# Patient Record
Sex: FEMALE | Race: WHITE | NOT HISPANIC OR LATINO | URBAN - METROPOLITAN AREA
[De-identification: names, ages, dates, MRNs, and addresses within clinical notes are randomized per-mention and may not be internally consistent; named-entity substitution may affect disease eponyms.]

---

## 2022-03-29 ENCOUNTER — INPATIENT (INPATIENT)
Facility: HOSPITAL | Age: 87
LOS: 1 days | Discharge: HOME HEALTH | DRG: 483 | End: 2022-03-31
Attending: ORTHOPAEDIC SURGERY | Admitting: ORTHOPAEDIC SURGERY
Payer: MEDICARE

## 2022-03-29 VITALS — HEIGHT: 61 IN | WEIGHT: 93.04 LBS

## 2022-03-29 DIAGNOSIS — S42.201A UNSPECIFIED FRACTURE OF UPPER END OF RIGHT HUMERUS, INITIAL ENCOUNTER FOR CLOSED FRACTURE: ICD-10-CM

## 2022-03-29 DIAGNOSIS — S42.291A OTHER DISPLACED FRACTURE OF UPPER END OF RIGHT HUMERUS, INITIAL ENCOUNTER FOR CLOSED FRACTURE: ICD-10-CM

## 2022-03-29 LAB
ADD ON TEST-SPECIMEN IN LAB: SIGNIFICANT CHANGE UP
ALBUMIN SERPL ELPH-MCNC: 3.5 G/DL — SIGNIFICANT CHANGE UP (ref 3.3–5)
ALP SERPL-CCNC: 61 U/L — SIGNIFICANT CHANGE UP (ref 40–120)
ALT FLD-CCNC: 22 U/L — SIGNIFICANT CHANGE UP (ref 12–78)
ANION GAP SERPL CALC-SCNC: 6 MMOL/L — SIGNIFICANT CHANGE UP (ref 5–17)
APTT BLD: 26.5 SEC — LOW (ref 27.5–35.5)
AST SERPL-CCNC: 20 U/L — SIGNIFICANT CHANGE UP (ref 15–37)
BASOPHILS # BLD AUTO: 0.07 K/UL — SIGNIFICANT CHANGE UP (ref 0–0.2)
BASOPHILS NFR BLD AUTO: 0.7 % — SIGNIFICANT CHANGE UP (ref 0–2)
BILIRUB SERPL-MCNC: 0.9 MG/DL — SIGNIFICANT CHANGE UP (ref 0.2–1.2)
BUN SERPL-MCNC: 19 MG/DL — SIGNIFICANT CHANGE UP (ref 7–23)
CALCIUM SERPL-MCNC: 8.8 MG/DL — SIGNIFICANT CHANGE UP (ref 8.5–10.1)
CHLORIDE SERPL-SCNC: 107 MMOL/L — SIGNIFICANT CHANGE UP (ref 96–108)
CO2 SERPL-SCNC: 29 MMOL/L — SIGNIFICANT CHANGE UP (ref 22–31)
CREAT SERPL-MCNC: 0.53 MG/DL — SIGNIFICANT CHANGE UP (ref 0.5–1.3)
EGFR: 91 ML/MIN/1.73M2 — SIGNIFICANT CHANGE UP
EOSINOPHIL # BLD AUTO: 0.18 K/UL — SIGNIFICANT CHANGE UP (ref 0–0.5)
EOSINOPHIL NFR BLD AUTO: 1.8 % — SIGNIFICANT CHANGE UP (ref 0–6)
GLUCOSE SERPL-MCNC: 102 MG/DL — HIGH (ref 70–99)
HCT VFR BLD CALC: 29.6 % — LOW (ref 34.5–45)
HGB BLD-MCNC: 9.6 G/DL — LOW (ref 11.5–15.5)
IMM GRANULOCYTES NFR BLD AUTO: 0.2 % — SIGNIFICANT CHANGE UP (ref 0–1.5)
INR BLD: 1.05 RATIO — SIGNIFICANT CHANGE UP (ref 0.88–1.16)
LYMPHOCYTES # BLD AUTO: 2.09 K/UL — SIGNIFICANT CHANGE UP (ref 1–3.3)
LYMPHOCYTES # BLD AUTO: 21.5 % — SIGNIFICANT CHANGE UP (ref 13–44)
MCHC RBC-ENTMCNC: 31.9 PG — SIGNIFICANT CHANGE UP (ref 27–34)
MCHC RBC-ENTMCNC: 32.4 GM/DL — SIGNIFICANT CHANGE UP (ref 32–36)
MCV RBC AUTO: 98.3 FL — SIGNIFICANT CHANGE UP (ref 80–100)
MONOCYTES # BLD AUTO: 0.67 K/UL — SIGNIFICANT CHANGE UP (ref 0–0.9)
MONOCYTES NFR BLD AUTO: 6.9 % — SIGNIFICANT CHANGE UP (ref 2–14)
NEUTROPHILS # BLD AUTO: 6.7 K/UL — SIGNIFICANT CHANGE UP (ref 1.8–7.4)
NEUTROPHILS NFR BLD AUTO: 68.9 % — SIGNIFICANT CHANGE UP (ref 43–77)
PLATELET # BLD AUTO: 200 K/UL — SIGNIFICANT CHANGE UP (ref 150–400)
POTASSIUM SERPL-MCNC: 3.5 MMOL/L — SIGNIFICANT CHANGE UP (ref 3.5–5.3)
POTASSIUM SERPL-SCNC: 3.5 MMOL/L — SIGNIFICANT CHANGE UP (ref 3.5–5.3)
PROT SERPL-MCNC: 7 GM/DL — SIGNIFICANT CHANGE UP (ref 6–8.3)
PROTHROM AB SERPL-ACNC: 12.2 SEC — SIGNIFICANT CHANGE UP (ref 10.5–13.4)
RBC # BLD: 3.01 M/UL — LOW (ref 3.8–5.2)
RBC # FLD: 13.7 % — SIGNIFICANT CHANGE UP (ref 10.3–14.5)
SARS-COV-2 RNA SPEC QL NAA+PROBE: SIGNIFICANT CHANGE UP
SODIUM SERPL-SCNC: 142 MMOL/L — SIGNIFICANT CHANGE UP (ref 135–145)
WBC # BLD: 9.73 K/UL — SIGNIFICANT CHANGE UP (ref 3.8–10.5)
WBC # FLD AUTO: 9.73 K/UL — SIGNIFICANT CHANGE UP (ref 3.8–10.5)

## 2022-03-29 PROCEDURE — 99285 EMERGENCY DEPT VISIT HI MDM: CPT

## 2022-03-29 PROCEDURE — 73030 X-RAY EXAM OF SHOULDER: CPT | Mod: 26,RT

## 2022-03-29 PROCEDURE — 82962 GLUCOSE BLOOD TEST: CPT

## 2022-03-29 PROCEDURE — 93005 ELECTROCARDIOGRAM TRACING: CPT

## 2022-03-29 PROCEDURE — G1004: CPT

## 2022-03-29 PROCEDURE — 86900 BLOOD TYPING SEROLOGIC ABO: CPT

## 2022-03-29 PROCEDURE — 93010 ELECTROCARDIOGRAM REPORT: CPT

## 2022-03-29 PROCEDURE — 71250 CT THORAX DX C-: CPT

## 2022-03-29 PROCEDURE — 82728 ASSAY OF FERRITIN: CPT

## 2022-03-29 PROCEDURE — 82248 BILIRUBIN DIRECT: CPT

## 2022-03-29 PROCEDURE — 86920 COMPATIBILITY TEST SPIN: CPT

## 2022-03-29 PROCEDURE — 76376 3D RENDER W/INTRP POSTPROCES: CPT | Mod: 26

## 2022-03-29 PROCEDURE — 82746 ASSAY OF FOLIC ACID SERUM: CPT

## 2022-03-29 PROCEDURE — 99221 1ST HOSP IP/OBS SF/LOW 40: CPT

## 2022-03-29 PROCEDURE — 87635 SARS-COV-2 COVID-19 AMP PRB: CPT

## 2022-03-29 PROCEDURE — 73200 CT UPPER EXTREMITY W/O DYE: CPT | Mod: 26,RT,ME

## 2022-03-29 PROCEDURE — 71045 X-RAY EXAM CHEST 1 VIEW: CPT

## 2022-03-29 PROCEDURE — 97162 PT EVAL MOD COMPLEX 30 MIN: CPT | Mod: GP

## 2022-03-29 PROCEDURE — 73030 X-RAY EXAM OF SHOULDER: CPT | Mod: RT

## 2022-03-29 PROCEDURE — 36415 COLL VENOUS BLD VENIPUNCTURE: CPT

## 2022-03-29 PROCEDURE — 97116 GAIT TRAINING THERAPY: CPT | Mod: GP

## 2022-03-29 PROCEDURE — 83010 ASSAY OF HAPTOGLOBIN QUANT: CPT

## 2022-03-29 PROCEDURE — 86850 RBC ANTIBODY SCREEN: CPT

## 2022-03-29 PROCEDURE — 97530 THERAPEUTIC ACTIVITIES: CPT | Mod: GP

## 2022-03-29 PROCEDURE — 83615 LACTATE (LD) (LDH) ENZYME: CPT

## 2022-03-29 PROCEDURE — 82306 VITAMIN D 25 HYDROXY: CPT

## 2022-03-29 PROCEDURE — 83550 IRON BINDING TEST: CPT

## 2022-03-29 PROCEDURE — 86901 BLOOD TYPING SEROLOGIC RH(D): CPT

## 2022-03-29 PROCEDURE — 85610 PROTHROMBIN TIME: CPT

## 2022-03-29 PROCEDURE — 71250 CT THORAX DX C-: CPT | Mod: 26

## 2022-03-29 PROCEDURE — 85025 COMPLETE CBC W/AUTO DIFF WBC: CPT

## 2022-03-29 PROCEDURE — 83540 ASSAY OF IRON: CPT

## 2022-03-29 PROCEDURE — 82040 ASSAY OF SERUM ALBUMIN: CPT

## 2022-03-29 PROCEDURE — 85730 THROMBOPLASTIN TIME PARTIAL: CPT

## 2022-03-29 PROCEDURE — P9016: CPT

## 2022-03-29 PROCEDURE — C1713: CPT

## 2022-03-29 PROCEDURE — 80048 BASIC METABOLIC PNL TOTAL CA: CPT

## 2022-03-29 PROCEDURE — 71045 X-RAY EXAM CHEST 1 VIEW: CPT | Mod: 26

## 2022-03-29 PROCEDURE — 88311 DECALCIFY TISSUE: CPT

## 2022-03-29 PROCEDURE — 73060 X-RAY EXAM OF HUMERUS: CPT | Mod: 26,RT

## 2022-03-29 PROCEDURE — 85027 COMPLETE CBC AUTOMATED: CPT

## 2022-03-29 PROCEDURE — 82607 VITAMIN B-12: CPT

## 2022-03-29 PROCEDURE — 36430 TRANSFUSION BLD/BLD COMPNT: CPT

## 2022-03-29 PROCEDURE — 85045 AUTOMATED RETICULOCYTE COUNT: CPT

## 2022-03-29 PROCEDURE — 80053 COMPREHEN METABOLIC PANEL: CPT

## 2022-03-29 PROCEDURE — C1776: CPT

## 2022-03-29 PROCEDURE — 99222 1ST HOSP IP/OBS MODERATE 55: CPT

## 2022-03-29 PROCEDURE — 88305 TISSUE EXAM BY PATHOLOGIST: CPT

## 2022-03-29 RX ORDER — ATORVASTATIN CALCIUM 80 MG/1
1 TABLET, FILM COATED ORAL
Qty: 0 | Refills: 0 | DISCHARGE

## 2022-03-29 RX ORDER — OXYCODONE HYDROCHLORIDE 5 MG/1
10 TABLET ORAL EVERY 4 HOURS
Refills: 0 | Status: DISCONTINUED | OUTPATIENT
Start: 2022-03-29 | End: 2022-03-29

## 2022-03-29 RX ORDER — PROCHLORPERAZINE MALEATE 5 MG
10 TABLET ORAL EVERY 8 HOURS
Refills: 0 | Status: DISCONTINUED | OUTPATIENT
Start: 2022-03-29 | End: 2022-03-31

## 2022-03-29 RX ORDER — ATORVASTATIN CALCIUM 80 MG/1
10 TABLET, FILM COATED ORAL AT BEDTIME
Refills: 0 | Status: DISCONTINUED | OUTPATIENT
Start: 2022-03-29 | End: 2022-03-31

## 2022-03-29 RX ORDER — ESCITALOPRAM OXALATE 10 MG/1
10 TABLET, FILM COATED ORAL DAILY
Refills: 0 | Status: DISCONTINUED | OUTPATIENT
Start: 2022-03-29 | End: 2022-03-31

## 2022-03-29 RX ORDER — LANOLIN ALCOHOL/MO/W.PET/CERES
3 CREAM (GRAM) TOPICAL AT BEDTIME
Refills: 0 | Status: DISCONTINUED | OUTPATIENT
Start: 2022-03-29 | End: 2022-03-31

## 2022-03-29 RX ORDER — HEPARIN SODIUM 5000 [USP'U]/ML
5000 INJECTION INTRAVENOUS; SUBCUTANEOUS ONCE
Refills: 0 | Status: DISCONTINUED | OUTPATIENT
Start: 2022-03-29 | End: 2022-03-29

## 2022-03-29 RX ORDER — TRAMADOL HYDROCHLORIDE 50 MG/1
50 TABLET ORAL EVERY 6 HOURS
Refills: 0 | Status: DISCONTINUED | OUTPATIENT
Start: 2022-03-29 | End: 2022-03-31

## 2022-03-29 RX ORDER — HEPARIN SODIUM 5000 [USP'U]/ML
5000 INJECTION INTRAVENOUS; SUBCUTANEOUS ONCE
Refills: 0 | Status: COMPLETED | OUTPATIENT
Start: 2022-03-29 | End: 2022-03-29

## 2022-03-29 RX ORDER — SODIUM CHLORIDE 9 MG/ML
1000 INJECTION, SOLUTION INTRAVENOUS
Refills: 0 | Status: DISCONTINUED | OUTPATIENT
Start: 2022-03-29 | End: 2022-03-29

## 2022-03-29 RX ORDER — SODIUM CHLORIDE 9 MG/ML
1000 INJECTION, SOLUTION INTRAVENOUS
Refills: 0 | Status: DISCONTINUED | OUTPATIENT
Start: 2022-03-29 | End: 2022-03-31

## 2022-03-29 RX ORDER — ESCITALOPRAM OXALATE 10 MG/1
1 TABLET, FILM COATED ORAL
Qty: 0 | Refills: 0 | DISCHARGE

## 2022-03-29 RX ORDER — HYDROMORPHONE HYDROCHLORIDE 2 MG/ML
0.5 INJECTION INTRAMUSCULAR; INTRAVENOUS; SUBCUTANEOUS EVERY 4 HOURS
Refills: 0 | Status: DISCONTINUED | OUTPATIENT
Start: 2022-03-29 | End: 2022-03-31

## 2022-03-29 RX ORDER — OXYCODONE HYDROCHLORIDE 5 MG/1
5 TABLET ORAL EVERY 4 HOURS
Refills: 0 | Status: DISCONTINUED | OUTPATIENT
Start: 2022-03-29 | End: 2022-03-29

## 2022-03-29 RX ORDER — AMLODIPINE BESYLATE 2.5 MG/1
1 TABLET ORAL
Qty: 0 | Refills: 0 | DISCHARGE

## 2022-03-29 RX ORDER — TRAMADOL HYDROCHLORIDE 50 MG/1
25 TABLET ORAL EVERY 6 HOURS
Refills: 0 | Status: DISCONTINUED | OUTPATIENT
Start: 2022-03-29 | End: 2022-03-31

## 2022-03-29 RX ORDER — PANTOPRAZOLE SODIUM 20 MG/1
40 TABLET, DELAYED RELEASE ORAL
Refills: 0 | Status: DISCONTINUED | OUTPATIENT
Start: 2022-03-29 | End: 2022-03-31

## 2022-03-29 RX ORDER — ACETAMINOPHEN 500 MG
975 TABLET ORAL EVERY 8 HOURS
Refills: 0 | Status: DISCONTINUED | OUTPATIENT
Start: 2022-03-29 | End: 2022-03-31

## 2022-03-29 RX ORDER — SENNA PLUS 8.6 MG/1
2 TABLET ORAL AT BEDTIME
Refills: 0 | Status: DISCONTINUED | OUTPATIENT
Start: 2022-03-29 | End: 2022-03-31

## 2022-03-29 RX ORDER — LISINOPRIL 2.5 MG/1
1 TABLET ORAL
Qty: 0 | Refills: 0 | DISCHARGE

## 2022-03-29 RX ORDER — ONDANSETRON 8 MG/1
4 TABLET, FILM COATED ORAL EVERY 8 HOURS
Refills: 0 | Status: DISCONTINUED | OUTPATIENT
Start: 2022-03-29 | End: 2022-03-31

## 2022-03-29 RX ORDER — LISINOPRIL 2.5 MG/1
20 TABLET ORAL
Refills: 0 | Status: DISCONTINUED | OUTPATIENT
Start: 2022-03-29 | End: 2022-03-31

## 2022-03-29 RX ADMIN — Medication 0.1 MILLIGRAM(S): at 21:03

## 2022-03-29 RX ADMIN — Medication 975 MILLIGRAM(S): at 17:33

## 2022-03-29 RX ADMIN — ATORVASTATIN CALCIUM 10 MILLIGRAM(S): 80 TABLET, FILM COATED ORAL at 21:02

## 2022-03-29 RX ADMIN — LISINOPRIL 20 MILLIGRAM(S): 2.5 TABLET ORAL at 21:03

## 2022-03-29 NOTE — H&P ADULT - PROBLEM SELECTOR PLAN 1
Over 60 minutes spent reviewing the history, speaking with pt and their family member, discussing the nature of the injury, reviewing imaging, discussing the surgery, obtaining surgical consent and coordinating plan w Attending

## 2022-03-29 NOTE — PATIENT PROFILE ADULT - FALL HARM RISK - HARM RISK INTERVENTIONS

## 2022-03-29 NOTE — ED STATDOCS - NS ED ATTENDING STATEMENT MOD
This was a shared visit with the KELLEY. I reviewed and verified the documentation and independently performed the documented:

## 2022-03-29 NOTE — ED ADULT TRIAGE NOTE - CHIEF COMPLAINT QUOTE
Pt presents to Ed for evaluation of right arm that began friday. Pt fell off step  on friday due to visual disturbance from macular degeneration. Hit right arm. + head strike.  Melody FLORES. Was seen by medical center in [place] and was found to have a break. Took tylenol 1000mg today for pain. Pt presents to Ed for evaluation of right arm pain that began friday s/p fall due to visual disturbance from macular degeneration. + head strike.  Melody AC. Was seen by medical center in [place] and was found to have a break. Took tylenol 1000mg today for pain.

## 2022-03-29 NOTE — ED STATDOCS - ATTENDING CONTRIBUTION TO CARE
I, Michael Roque MD,  performed the initial face to face bedside interview with this patient regarding history of present illness, review of symptoms and relevant past medical, social and family history.  I completed an independent physical examination.  I was the initial provider who evaluated this patient.   I personally saw the patient and performed a substantive portion of the visit including all aspects of the medical decision making.  I have signed out the follow up of any pending tests (i.e. labs, radiological studies) to the KELLEY.  I have communicated the patient’s plan of care and disposition with the KELLEY.  The history, relevant review of systems, past medical and surgical history, medical decision making, and physical examination was documented by the scribe in my presence and I attest to the accuracy of the documentation.

## 2022-03-29 NOTE — ED STATDOCS - OBJECTIVE STATEMENT
88 y/o female with a PMHx of anxiety, HTN on Lisinopril, macular degeneration presents to the ED c/o right arm pain s/p fall on 3/25. Pt was in [place] at the time of the fall, was seen in an ED and had imaging showing a right proximal humerus fracture. No other complaints at this time.

## 2022-03-29 NOTE — ED ADULT NURSE NOTE - OBJECTIVE STATEMENT
Coming at 6 to see Dr Lexi Melton.
Mother states patient has a lot of cold/flu, cough, congestion, and fever two days ago 102. 1. Patient was not able to attend school since yesterday and wants to know if Dr. Bryce Amaya can see her today.
presents to the ED c/o right arm pain s/p fall on 3/25. Pt was in [place] at the time of the fall, was seen in an ED and had imaging showing a right proximal humerus fracture. No other complaints at this time.

## 2022-03-29 NOTE — ED ADULT NURSE NOTE - NSIMPLEMENTINTERV_GEN_ALL_ED
Implemented All Fall with Harm Risk Interventions:  [place] to call system. Call bell, personal items and telephone within reach. Instruct patient to call for assistance. Room bathroom lighting operational. Non-slip footwear when patient is off stretcher. Physically safe environment: no spills, clutter or unnecessary equipment. Stretcher in lowest position, wheels locked, appropriate side rails in place. Provide visual cue, wrist band, yellow gown, etc. Monitor gait and stability. Monitor for mental status changes and reorient to person, place, and time. Review medications for side effects contributing to fall risk. Reinforce activity limits and safety measures with patient and family. Provide visual clues: red socks.

## 2022-03-29 NOTE — H&P ADULT - NSHPPHYSICALEXAM_GEN_ALL_CORE
Imaging: XR and CT demonstrate Right 100% displaced proximal humerus fracture    Vital Signs Last 24 Hrs  T(C): 36.9 (29 Mar 2022 11:35), Max: 36.9 (29 Mar 2022 11:35)  T(F): 98.5 (29 Mar 2022 11:35), Max: 98.5 (29 Mar 2022 11:35)  HR: 70 (29 Mar 2022 11:35) (70 - 70)  BP: 152/65 (29 Mar 2022 11:35) (152/65 - 152/65)  BP(mean): 106 (29 Mar 2022 11:35) (106 - 106)  RR: 18 (29 Mar 2022 11:35) (18 - 18)  SpO2: 96% (29 Mar 2022 11:35) (96% - 96%)    Physical Exam  Gen: NAD, Alert and Awake, Follows Commands, calm, sitting up in chair  Head: AT NC no facial injuries noted  Neck: Supple  Heart: Pulse regular  Lungs: Breathing nonlabored  Abdomen: No guarding  Neuro: Moving all 4 extremities  LE : No edema  Spine: C-spine FAROm no bony TTP   T/L-spine: No bony TTP no step off  Musculo:  RUE: Skin intact, Moderate ecchymosis not much Swelling to shoulder noted. Cannot AROM shoulder due to pain. r/u/m/ain/pin function intact. No bony TTP or swelling to Elbow, wrist or digits. SILT over deltoid. SILT digits 1-5, warm to touch. 2+ radial pulse. Compartments soft and compressible.  LUE: Full painless baseline ROM at shoulder, Elbow, Wrist and digits   Bilateral Lower Ext: Full painless baseline ROM hips knees and ankles. Able to SLR actively painlessly

## 2022-03-29 NOTE — H&P ADULT - ASSESSMENT
A/P: 87y Female with Right displaced proximal humerus fracture, closed  -Admit to DR Islas  -NPO  -Plan for OR today reverse total shoulder arthroplasty  -Pt has signed consent  -Pain control  -Sling in place  -NWB RUE   -Medical preop eval appreciated  -Above as discussed with/directed by Dr Islas

## 2022-03-29 NOTE — ED STATDOCS - PROGRESS NOTE DETAILS
XR reviewed. D/w orthopedics. Made aware family is requesting Dr. Islas. Recommended CT shoulder, will see patient in ED. - Patel Duran PA-C 88 y/o F with PMH of HTN, anxiety, macular degeneration presents with right shoulder pain following fall 5 days ago. Pt was in [place], [place] at time of fall. Had XR at local hospital which showed humerus fracture. Contacted local orthopedist in [place], [place] who stated she could be seen in 3 weeks. Daughter then had patient transported to [place] for evaluation today. Has disc with images. No other injuries from fall reported. No new injuries reported. Requesting Dr. Islas. PE; Well appearing. Cardiac: s1s2, RRR. Lungs; CTAB. MSK: +right UE in sling. Sensation intact to light touch in UE. Cap refill < 2 sec in UE. +TTP proximal humerus. A/P: Humerus fracture. plan for repeat XRs, will upload images from disc. D/w orthopedics. - Patel Duran PA-C

## 2022-03-29 NOTE — CONSULT NOTE ADULT - SUBJECTIVE AND OBJECTIVE BOX
c/c: right humerus fracture, RUE pain    HPI: 87F, pmh of HTN, HLD, Anxiety, Mitral valve prolapse, Macular degeneration, cataracts who underwent medical clearance in prep for cataract surgery, but cancelled d/t the pandemic and she has since opted out of the procedure who presented to the ED with a right proximal humerus fracture. She lives in [place] when she was coming downstairs, misjudged the last step as it was the same color as the sidewalk and fell onto her right UE.   She went to a local ED, had xrays and was told to have a proximal humerus fracture. She was put in a sling and advised outpt f/u with her outpatient orthopedics who she could not get an appt with for 3 weeks. Her family flew her up here and she is now being admitted for surgery.   She denies any sob/chest pains. Walks every day. Has no trouble walking a few blocks or climbing a flight of stairs. No recent f/c/r. No cough/sputum/dysuria.   Not on blood thinners, does not bleed excessively. Has never had general anesthesia before.   Had some n/v afterlleaving ER in [place] which lasted about 1 day. NO parasthesias. no focal weakness. Denies h/o cardiac disease or stroke. Last stress test was >10 years ago.   Last ate around 10 am.    ROS: all 10 systems reviewed and is as above otherwise negative.     PMH: as above  PSH: D+C  Social: No tobacco/etoh  Allergies: NKDA  Home meds: see med rec.    Vital Signs Last 24 Hrs  T(C): 36.9 (29 Mar 2022 11:35), Max: 36.9 (29 Mar 2022 11:35)  T(F): 98.5 (29 Mar 2022 11:35), Max: 98.5 (29 Mar 2022 11:35)  HR: 70 (29 Mar 2022 11:35) (70 - 70)  BP: 152/65 (29 Mar 2022 11:35) (152/65 - 152/65)  BP(mean): 106 (29 Mar 2022 11:35) (106 - 106)  RR: 18 (29 Mar 2022 11:35) (18 - 18)  SpO2: 96% (29 Mar 2022 11:35) (96% - 96%)     PHYSICAL EXAM:    GENERAL: Comfortable, no acute distress   HEAD:  Normocephalic, atraumatic  EYES: EOMI, PERRLA  HEENT: Moist mucous membranes  NECK: Supple, No JVD  NERVOUS SYSTEM:  Alert & Oriented X3, non focal.   CHEST/LUNG: Clear to auscultation bilaterally  HEART: Regular rate and rhythm  ABDOMEN: Soft, Nontender, Nondistended, Bowel sounds present  GENITOURINARY: Voiding, no palpable bladder  EXTREMITIES:   No clubbing, cyanosis, or edema  MUSCULOSKELETAL- RUE sling. +ecchmosis/swelling with underlying hematoma.   SKIN-no rash    LABS:                        9.6    9.73  )-----------( 200      ( 29 Mar 2022 15:03 )             29.6     03-29    142  |  107  |  19  ----------------------------<  102<H>  3.5   |  29  |  0.53    Ca    8.8      29 Mar 2022 15:03    TPro  7.0  /  Alb  3.5  /  TBili  0.9  /  DBili  x   /  AST  20  /  ALT  22  /  AlkPhos  61  03-29    PT/INR - ( 29 Mar 2022 15:03 )   PT: 12.2 sec;   INR: 1.05 ratio         PTT - ( 29 Mar 2022 15:03 )  PTT:26.5 sec    CT 3D Reconstruct w/o Workstation (03.29.22 @ 13:33) >  IMPRESSION:  1. Displaced comminuted humeral neck fracture is again seen with one bone   width anterior and superior displacement of the diaphysis that is   positioned below the coracoid process.  2. There is comminution of the inferior margin of the lesser tuberosity   and slightly displaced fracture component involves the greater tuberosity.  3. Several tiny pulmonary nodules are noted measuring up to approximately   0.2 cm. Correlation with priors if available is suggested.  This may not   warrant additional followup if the patient has no risk factors per the   Fleischner society guidelines.  If the patient has risk factors a   followup chest CT is suggested in 12 months.      MEDICATIONS  (STANDING):  acetaminophen     Tablet .. 975 milliGRAM(s) Oral every 8 hours  heparin SubCutaneous Injection - Peds 5000 Unit(s) SubCutaneous once  lactated ringers. 1000 milliLiter(s) (75 mL/Hr) IV Continuous <Continuous>  pantoprazole    Tablet 40 milliGRAM(s) Oral before breakfast  senna 2 Tablet(s) Oral at bedtime    MEDICATIONS  (PRN):  HYDROmorphone  Injectable 0.5 milliGRAM(s) SubCutaneous every 4 hours PRN Severe Pain (7 - 10)  melatonin 3 milliGRAM(s) Oral at bedtime PRN Insomnia  ondansetron Injectable 4 milliGRAM(s) IV Push every 8 hours PRN Nausea and/or Vomiting  oxyCODONE    IR 5 milliGRAM(s) Oral every 4 hours PRN Mild Pain (1 - 3)  oxyCODONE    IR 10 milliGRAM(s) Oral every 4 hours PRN Moderate Pain (4 - 6)  prochlorperazine   Injectable 10 milliGRAM(s) IntraMuscular every 8 hours PRN Nausea and/or Vomiting      ASSESSMENT AND PLAN:  87f, PMH as above a/w:    1. Subacute displaced Right proximal humerus fracture:  -admitted to ortho.   -pain control with tylenol, tramadol.   -labs/ekg reviewed    2. HTN:  -continue ace-i/clonidine with hold parameters     3. Anxiety:  -continue lexapro.     4. DVT px:  -hep sq X 1 today if OR Tomorrow.                    c/c: right humerus fracture, RUE pain    HPI: 87F, pmh of HTN, HLD, Anxiety, Mitral valve prolapse,LBBB, Macular degeneration, cataracts who underwent medical clearance in prep for cataract surgery, but cancelled d/t the pandemic and she has since opted out of the procedure who presented to the ED with a right proximal humerus fracture. She lives in [place] she was coming downstairs, misjudged the last step as it was the same color as the sidewalk and fell onto her right UE.   She went to a local ED, had xrays and was told to have a proximal humerus fracture. She was put in a sling and advised outpt f/u with her outpatient orthopedics who she could not get an appt with for 3 weeks. Her family flew her up here and she is now being admitted for surgery.   She denies any sob/chest pains. Walks every day. Has no trouble walking a few blocks or climbing a flight of stairs. No recent f/c/r. No cough/sputum/dysuria.   Not on blood thinners, does not bleed excessively. Has never had general anesthesia before.   Had some n/v afterlleaving ER in [place] which lasted about 1 day. No parasthesias. no focal weakness. Denies h/o cardiac disease or stroke. Last stress test was >10 years ago.   Last ate around 10 am.    ROS: all 10 systems reviewed and is as above otherwise negative.     PMH: as above  PSH: D+C  Social: No tobacco/etoh  Allergies: NKDA  Home meds: see med rec.    Vital Signs Last 24 Hrs  T(C): 36.9 (29 Mar 2022 11:35), Max: 36.9 (29 Mar 2022 11:35)  T(F): 98.5 (29 Mar 2022 11:35), Max: 98.5 (29 Mar 2022 11:35)  HR: 70 (29 Mar 2022 11:35) (70 - 70)  BP: 152/65 (29 Mar 2022 11:35) (152/65 - 152/65)  BP(mean): 106 (29 Mar 2022 11:35) (106 - 106)  RR: 18 (29 Mar 2022 11:35) (18 - 18)  SpO2: 96% (29 Mar 2022 11:35) (96% - 96%)     PHYSICAL EXAM:    GENERAL: Comfortable, no acute distress   HEAD:  Normocephalic, atraumatic  EYES: EOMI, PERRLA  HEENT: Moist mucous membranes  NECK: Supple, No JVD  NERVOUS SYSTEM:  Alert & Oriented X3, non focal.   CHEST/LUNG: Clear to auscultation bilaterally  HEART: Regular rate and rhythm  ABDOMEN: Soft, Nontender, Nondistended, Bowel sounds present  GENITOURINARY: Voiding, no palpable bladder  EXTREMITIES:   No clubbing, cyanosis, or edema  MUSCULOSKELETAL- RUE sling. +ecchymosis/swelling with underlying hematoma.   SKIN-no rash    LABS:                        9.6    9.73  )-----------( 200      ( 29 Mar 2022 15:03 )             29.6     03-29    142  |  107  |  19  ----------------------------<  102<H>  3.5   |  29  |  0.53    Ca    8.8      29 Mar 2022 15:03    TPro  7.0  /  Alb  3.5  /  TBili  0.9  /  DBili  x   /  AST  20  /  ALT  22  /  AlkPhos  61  03-29    PT/INR - ( 29 Mar 2022 15:03 )   PT: 12.2 sec;   INR: 1.05 ratio         PTT - ( 29 Mar 2022 15:03 )  PTT:26.5 sec    CT 3D Reconstruct w/o Workstation (03.29.22 @ 13:33) >  IMPRESSION:  1. Displaced comminuted humeral neck fracture is again seen with one bone   width anterior and superior displacement of the diaphysis that is   positioned below the coracoid process.  2. There is comminution of the inferior margin of the lesser tuberosity   and slightly displaced fracture component involves the greater tuberosity.  3. Several tiny pulmonary nodules are noted measuring up to approximately   0.2 cm. Correlation with priors if available is suggested.  This may not   warrant additional followup if the patient has no risk factors per the   Fleischner society guidelines.  If the patient has risk factors a   followup chest CT is suggested in 12 months.    EKG: my read-LBBB  MEDICATIONS  (STANDING):  acetaminophen     Tablet .. 975 milliGRAM(s) Oral every 8 hours  heparin SubCutaneous Injection - Peds 5000 Unit(s) SubCutaneous once  lactated ringers. 1000 milliLiter(s) (75 mL/Hr) IV Continuous <Continuous>  pantoprazole    Tablet 40 milliGRAM(s) Oral before breakfast  senna 2 Tablet(s) Oral at bedtime    MEDICATIONS  (PRN):  HYDROmorphone  Injectable 0.5 milliGRAM(s) SubCutaneous every 4 hours PRN Severe Pain (7 - 10)  melatonin 3 milliGRAM(s) Oral at bedtime PRN Insomnia  ondansetron Injectable 4 milliGRAM(s) IV Push every 8 hours PRN Nausea and/or Vomiting  oxyCODONE    IR 5 milliGRAM(s) Oral every 4 hours PRN Mild Pain (1 - 3)  oxyCODONE    IR 10 milliGRAM(s) Oral every 4 hours PRN Moderate Pain (4 - 6)  prochlorperazine   Injectable 10 milliGRAM(s) IntraMuscular every 8 hours PRN Nausea and/or Vomiting      ASSESSMENT AND PLAN:  87f, PMH as above a/w:    1. Subacute displaced Right proximal humerus fracture:  -admitted to ortho.   -pain control with tylenol, tramadol.   -labs/ekg reviewed. LBBB is old per d/w patient.   -no medical contraindications for OR at this time.   -incentive spirometry  -PT eval post op.     2. Anemia:  -could be blood loss related to fracture (has hematoma).   -check iron studies/b12/folate/retic/stool guiac, etc     3. Pulmonary nodules:  -seen on CT shoulder.   -check CT dedicated to chest for further evaluation.     4. HTN:  -continue ace-i/clonidine with hold parameters     5. Anxiety:  -continue lexapro.     6. DVT px:  -hep sq X 1 today if OR tomorrow.       thanks will follow.  d/w family at bedside/ortho           c/c: right humerus fracture, RUE pain    HPI: 87F, pmh of HTN, HLD, Anxiety, Mitral valve prolapse,LBBB, Macular degeneration, cataracts who underwent medical clearance in prep for cataract surgery, but cancelled d/t the pandemic and she has since opted out of the procedure who presented to the ED with a right proximal humerus fracture. She lives in [place] she was coming downstairs, misjudged the last step as it was the same color as the sidewalk and fell onto her right UE on 3/25.   She went to a local ED, had xrays and was told to have a proximal humerus fracture. She was put in a sling and advised outpt f/u with her outpatient orthopedics who she could not get an appt with for 3 weeks. Her family flew her up here and she is now being admitted for surgery.   She denies any sob/chest pains. Walks every day. Has no trouble walking a few blocks or climbing a flight of stairs. No recent f/c/r. No cough/sputum/dysuria.   Not on blood thinners, does not bleed excessively. Has never had general anesthesia before.   Had some n/v afterlleaving ER in [place] which lasted about 1 day. No parasthesias. no focal weakness. Denies h/o cardiac disease or stroke. Last stress test was >10 years ago.   Last ate around 10 am.    ROS: all 10 systems reviewed and is as above otherwise negative.     PMH: as above  PSH: D+C  Social: No tobacco/etoh  Allergies: NKDA  Home meds: see med rec.    Vital Signs Last 24 Hrs  T(C): 36.9 (29 Mar 2022 11:35), Max: 36.9 (29 Mar 2022 11:35)  T(F): 98.5 (29 Mar 2022 11:35), Max: 98.5 (29 Mar 2022 11:35)  HR: 70 (29 Mar 2022 11:35) (70 - 70)  BP: 152/65 (29 Mar 2022 11:35) (152/65 - 152/65)  BP(mean): 106 (29 Mar 2022 11:35) (106 - 106)  RR: 18 (29 Mar 2022 11:35) (18 - 18)  SpO2: 96% (29 Mar 2022 11:35) (96% - 96%)     PHYSICAL EXAM:    GENERAL: Comfortable, no acute distress   HEAD:  Normocephalic, atraumatic  EYES: EOMI, PERRLA  HEENT: Moist mucous membranes  NECK: Supple, No JVD  NERVOUS SYSTEM:  Alert & Oriented X3, non focal.   CHEST/LUNG: Clear to auscultation bilaterally  HEART: Regular rate and rhythm  ABDOMEN: Soft, Nontender, Nondistended, Bowel sounds present  GENITOURINARY: Voiding, no palpable bladder  EXTREMITIES:   No clubbing, cyanosis, or edema  MUSCULOSKELETAL- RUE sling. +ecchymosis/swelling with underlying hematoma.   SKIN-no rash    LABS:                        9.6    9.73  )-----------( 200      ( 29 Mar 2022 15:03 )             29.6     03-29    142  |  107  |  19  ----------------------------<  102<H>  3.5   |  29  |  0.53    Ca    8.8      29 Mar 2022 15:03    TPro  7.0  /  Alb  3.5  /  TBili  0.9  /  DBili  x   /  AST  20  /  ALT  22  /  AlkPhos  61  03-29    PT/INR - ( 29 Mar 2022 15:03 )   PT: 12.2 sec;   INR: 1.05 ratio         PTT - ( 29 Mar 2022 15:03 )  PTT:26.5 sec    CT 3D Reconstruct w/o Workstation (03.29.22 @ 13:33) >  IMPRESSION:  1. Displaced comminuted humeral neck fracture is again seen with one bone   width anterior and superior displacement of the diaphysis that is   positioned below the coracoid process.  2. There is comminution of the inferior margin of the lesser tuberosity   and slightly displaced fracture component involves the greater tuberosity.  3. Several tiny pulmonary nodules are noted measuring up to approximately   0.2 cm. Correlation with priors if available is suggested.  This may not   warrant additional followup if the patient has no risk factors per the   Fleischner society guidelines.  If the patient has risk factors a   followup chest CT is suggested in 12 months.    EKG: my read-LBBB  MEDICATIONS  (STANDING):  acetaminophen     Tablet .. 975 milliGRAM(s) Oral every 8 hours  heparin SubCutaneous Injection - Peds 5000 Unit(s) SubCutaneous once  lactated ringers. 1000 milliLiter(s) (75 mL/Hr) IV Continuous <Continuous>  pantoprazole    Tablet 40 milliGRAM(s) Oral before breakfast  senna 2 Tablet(s) Oral at bedtime    MEDICATIONS  (PRN):  HYDROmorphone  Injectable 0.5 milliGRAM(s) SubCutaneous every 4 hours PRN Severe Pain (7 - 10)  melatonin 3 milliGRAM(s) Oral at bedtime PRN Insomnia  ondansetron Injectable 4 milliGRAM(s) IV Push every 8 hours PRN Nausea and/or Vomiting  oxyCODONE    IR 5 milliGRAM(s) Oral every 4 hours PRN Mild Pain (1 - 3)  oxyCODONE    IR 10 milliGRAM(s) Oral every 4 hours PRN Moderate Pain (4 - 6)  prochlorperazine   Injectable 10 milliGRAM(s) IntraMuscular every 8 hours PRN Nausea and/or Vomiting      ASSESSMENT AND PLAN:  87f, PMH as above a/w:    1. Subacute displaced Right proximal humerus fracture:  -admitted to ortho.   -pain control with tylenol, tramadol.   -labs/ekg reviewed. LBBB is old per d/w patient.   -no medical contraindications for OR at this time.   -incentive spirometry  -PT eval post op.     2. Anemia:  -could be blood loss related to fracture (has hematoma).   -check iron studies/b12/folate/retic/stool guiac, etc     3. Pulmonary nodules:  -seen on CT shoulder.   -check CT dedicated to chest for further evaluation.     4. HTN:  -continue ace-i/clonidine with hold parameters     5. Anxiety:  -continue lexapro.     6. DVT px:  -hep sq X 1 today if OR tomorrow.       thanks will follow.  d/w family at bedside/ortho

## 2022-03-29 NOTE — ED STATDOCS - MUSCULOSKELETAL, MLM
Deformity right proximal humerus. TTP right shoulder Deformity right proximal humerus. TTP right shoulder, distal n/v/m intact

## 2022-03-29 NOTE — H&P ADULT - HISTORY OF PRESENT ILLNESS
87y Female PMH HTN HLD anxiety resides in [place] and RHD presents c/o Right shoulder pain sp mechanical fall 3 days ago. Pt was on vacation w family in [place] and pt mis-stepped the last step at the bottom of the staircase and fell forward onto her right shoulder. She had immediate pain to the shoulder. Fall was witnessed by her step-daughter. She was seen at local hospital there and treated in a sling. The step-daughter is familiar w Dr Islas and decided to bring her step-mother her to the ED for evaluation. And xray was done in the ED today and Orthopedics was called to manage. Pt was seen, had a CT scan all reviewed by DR Islas who recommends reverse shoulder arthroplasty to regain function asap. Pt wishes to have surgery here and once recovered return to [place] where she lives alone and cares for herself. She does not drive due to macular degeneration and uses no assistive devices. She is otherwise very active. Denies HS/LOC. Denies numbness/tingling. Denies fever/chills. Denies pain/injury elsewhere. No other complaints. Pt seen and treated by the Orthopedic PA/Resident Team.     HEALTH ISSUES - PROBLEM Dx:        MEDICATIONS  (STANDING):    Allergies    No Known Allergies    Intolerances

## 2022-03-30 DIAGNOSIS — S42.209A UNSPECIFIED FRACTURE OF UPPER END OF UNSPECIFIED HUMERUS, INITIAL ENCOUNTER FOR CLOSED FRACTURE: ICD-10-CM

## 2022-03-30 LAB
24R-OH-CALCIDIOL SERPL-MCNC: 18.9 NG/ML — LOW (ref 30–80)
ANION GAP SERPL CALC-SCNC: 5 MMOL/L — SIGNIFICANT CHANGE UP (ref 5–17)
ANION GAP SERPL CALC-SCNC: 5 MMOL/L — SIGNIFICANT CHANGE UP (ref 5–17)
APTT BLD: 26.2 SEC — LOW (ref 27.5–35.5)
BUN SERPL-MCNC: 11 MG/DL — SIGNIFICANT CHANGE UP (ref 7–23)
BUN SERPL-MCNC: 16 MG/DL — SIGNIFICANT CHANGE UP (ref 7–23)
CALCIUM SERPL-MCNC: 8.4 MG/DL — LOW (ref 8.5–10.1)
CALCIUM SERPL-MCNC: 8.6 MG/DL — SIGNIFICANT CHANGE UP (ref 8.5–10.1)
CHLORIDE SERPL-SCNC: 109 MMOL/L — HIGH (ref 96–108)
CHLORIDE SERPL-SCNC: 110 MMOL/L — HIGH (ref 96–108)
CO2 SERPL-SCNC: 28 MMOL/L — SIGNIFICANT CHANGE UP (ref 22–31)
CO2 SERPL-SCNC: 29 MMOL/L — SIGNIFICANT CHANGE UP (ref 22–31)
CREAT SERPL-MCNC: 0.38 MG/DL — LOW (ref 0.5–1.3)
CREAT SERPL-MCNC: 0.49 MG/DL — LOW (ref 0.5–1.3)
EGFR: 92 ML/MIN/1.73M2 — SIGNIFICANT CHANGE UP
EGFR: 98 ML/MIN/1.73M2 — SIGNIFICANT CHANGE UP
GLUCOSE SERPL-MCNC: 127 MG/DL — HIGH (ref 70–99)
GLUCOSE SERPL-MCNC: 88 MG/DL — SIGNIFICANT CHANGE UP (ref 70–99)
HCT VFR BLD CALC: 24.7 % — LOW (ref 34.5–45)
HCT VFR BLD CALC: 27.9 % — LOW (ref 34.5–45)
HCT VFR BLD CALC: 30.2 % — LOW (ref 34.5–45)
HGB BLD-MCNC: 7.7 G/DL — LOW (ref 11.5–15.5)
HGB BLD-MCNC: 9 G/DL — LOW (ref 11.5–15.5)
HGB BLD-MCNC: 9.9 G/DL — LOW (ref 11.5–15.5)
INR BLD: 1.08 RATIO — SIGNIFICANT CHANGE UP (ref 0.88–1.16)
INR BLD: 1.09 RATIO — SIGNIFICANT CHANGE UP (ref 0.88–1.16)
MCHC RBC-ENTMCNC: 31.1 PG — SIGNIFICANT CHANGE UP (ref 27–34)
MCHC RBC-ENTMCNC: 31.2 GM/DL — LOW (ref 32–36)
MCHC RBC-ENTMCNC: 31.2 PG — SIGNIFICANT CHANGE UP (ref 27–34)
MCHC RBC-ENTMCNC: 31.6 PG — SIGNIFICANT CHANGE UP (ref 27–34)
MCHC RBC-ENTMCNC: 32.3 GM/DL — SIGNIFICANT CHANGE UP (ref 32–36)
MCHC RBC-ENTMCNC: 32.8 GM/DL — SIGNIFICANT CHANGE UP (ref 32–36)
MCV RBC AUTO: 100 FL — SIGNIFICANT CHANGE UP (ref 80–100)
MCV RBC AUTO: 96.5 FL — SIGNIFICANT CHANGE UP (ref 80–100)
MCV RBC AUTO: 96.5 FL — SIGNIFICANT CHANGE UP (ref 80–100)
PLATELET # BLD AUTO: 179 K/UL — SIGNIFICANT CHANGE UP (ref 150–400)
PLATELET # BLD AUTO: 184 K/UL — SIGNIFICANT CHANGE UP (ref 150–400)
PLATELET # BLD AUTO: 184 K/UL — SIGNIFICANT CHANGE UP (ref 150–400)
POTASSIUM SERPL-MCNC: 3.6 MMOL/L — SIGNIFICANT CHANGE UP (ref 3.5–5.3)
POTASSIUM SERPL-MCNC: 3.7 MMOL/L — SIGNIFICANT CHANGE UP (ref 3.5–5.3)
POTASSIUM SERPL-SCNC: 3.6 MMOL/L — SIGNIFICANT CHANGE UP (ref 3.5–5.3)
POTASSIUM SERPL-SCNC: 3.7 MMOL/L — SIGNIFICANT CHANGE UP (ref 3.5–5.3)
PROTHROM AB SERPL-ACNC: 12.5 SEC — SIGNIFICANT CHANGE UP (ref 10.5–13.4)
PROTHROM AB SERPL-ACNC: 12.6 SEC — SIGNIFICANT CHANGE UP (ref 10.5–13.4)
RBC # BLD: 2.47 M/UL — LOW (ref 3.8–5.2)
RBC # BLD: 2.89 M/UL — LOW (ref 3.8–5.2)
RBC # BLD: 3.13 M/UL — LOW (ref 3.8–5.2)
RBC # FLD: 13.6 % — SIGNIFICANT CHANGE UP (ref 10.3–14.5)
RBC # FLD: 14 % — SIGNIFICANT CHANGE UP (ref 10.3–14.5)
RBC # FLD: 14.3 % — SIGNIFICANT CHANGE UP (ref 10.3–14.5)
SODIUM SERPL-SCNC: 143 MMOL/L — SIGNIFICANT CHANGE UP (ref 135–145)
SODIUM SERPL-SCNC: 143 MMOL/L — SIGNIFICANT CHANGE UP (ref 135–145)
WBC # BLD: 6.34 K/UL — SIGNIFICANT CHANGE UP (ref 3.8–10.5)
WBC # BLD: 7.62 K/UL — SIGNIFICANT CHANGE UP (ref 3.8–10.5)
WBC # BLD: 9.1 K/UL — SIGNIFICANT CHANGE UP (ref 3.8–10.5)
WBC # FLD AUTO: 6.34 K/UL — SIGNIFICANT CHANGE UP (ref 3.8–10.5)
WBC # FLD AUTO: 7.62 K/UL — SIGNIFICANT CHANGE UP (ref 3.8–10.5)
WBC # FLD AUTO: 9.1 K/UL — SIGNIFICANT CHANGE UP (ref 3.8–10.5)

## 2022-03-30 PROCEDURE — 88311 DECALCIFY TISSUE: CPT | Mod: 26

## 2022-03-30 PROCEDURE — 73030 X-RAY EXAM OF SHOULDER: CPT | Mod: 26,RT

## 2022-03-30 PROCEDURE — 99223 1ST HOSP IP/OBS HIGH 75: CPT

## 2022-03-30 PROCEDURE — 88305 TISSUE EXAM BY PATHOLOGIST: CPT | Mod: 26

## 2022-03-30 PROCEDURE — 93010 ELECTROCARDIOGRAM REPORT: CPT

## 2022-03-30 PROCEDURE — 99232 SBSQ HOSP IP/OBS MODERATE 35: CPT

## 2022-03-30 RX ORDER — SODIUM CHLORIDE 9 MG/ML
1000 INJECTION, SOLUTION INTRAVENOUS
Refills: 0 | Status: DISCONTINUED | OUTPATIENT
Start: 2022-03-30 | End: 2022-03-30

## 2022-03-30 RX ORDER — OXYCODONE HYDROCHLORIDE 5 MG/1
5 TABLET ORAL EVERY 4 HOURS
Refills: 0 | Status: DISCONTINUED | OUTPATIENT
Start: 2022-03-31 | End: 2022-03-31

## 2022-03-30 RX ORDER — ONDANSETRON 8 MG/1
4 TABLET, FILM COATED ORAL ONCE
Refills: 0 | Status: DISCONTINUED | OUTPATIENT
Start: 2022-03-30 | End: 2022-03-30

## 2022-03-30 RX ORDER — POLYETHYLENE GLYCOL 3350 17 G/17G
17 POWDER, FOR SOLUTION ORAL AT BEDTIME
Refills: 0 | Status: DISCONTINUED | OUTPATIENT
Start: 2022-03-31 | End: 2022-03-31

## 2022-03-30 RX ORDER — CELECOXIB 200 MG/1
200 CAPSULE ORAL EVERY 12 HOURS
Refills: 0 | Status: DISCONTINUED | OUTPATIENT
Start: 2022-03-31 | End: 2022-03-31

## 2022-03-30 RX ORDER — OXYCODONE HYDROCHLORIDE 5 MG/1
10 TABLET ORAL ONCE
Refills: 0 | Status: DISCONTINUED | OUTPATIENT
Start: 2022-03-30 | End: 2022-03-30

## 2022-03-30 RX ORDER — SENNA PLUS 8.6 MG/1
2 TABLET ORAL AT BEDTIME
Refills: 0 | Status: DISCONTINUED | OUTPATIENT
Start: 2022-03-31 | End: 2022-03-31

## 2022-03-30 RX ORDER — PANTOPRAZOLE SODIUM 20 MG/1
40 TABLET, DELAYED RELEASE ORAL
Refills: 0 | Status: DISCONTINUED | OUTPATIENT
Start: 2022-03-31 | End: 2022-03-31

## 2022-03-30 RX ORDER — MORPHINE SULFATE 50 MG/1
2 CAPSULE, EXTENDED RELEASE ORAL
Refills: 0 | Status: DISCONTINUED | OUTPATIENT
Start: 2022-03-30 | End: 2022-03-30

## 2022-03-30 RX ORDER — FENTANYL CITRATE 50 UG/ML
25 INJECTION INTRAVENOUS
Refills: 0 | Status: DISCONTINUED | OUTPATIENT
Start: 2022-03-30 | End: 2022-03-30

## 2022-03-30 RX ORDER — PROCHLORPERAZINE MALEATE 5 MG
10 TABLET ORAL EVERY 8 HOURS
Refills: 0 | Status: DISCONTINUED | OUTPATIENT
Start: 2022-03-31 | End: 2022-03-31

## 2022-03-30 RX ORDER — OXYCODONE HYDROCHLORIDE 5 MG/1
10 TABLET ORAL EVERY 4 HOURS
Refills: 0 | Status: DISCONTINUED | OUTPATIENT
Start: 2022-03-31 | End: 2022-03-31

## 2022-03-30 RX ORDER — CHOLECALCIFEROL (VITAMIN D3) 125 MCG
800 CAPSULE ORAL DAILY
Refills: 0 | Status: DISCONTINUED | OUTPATIENT
Start: 2022-03-30 | End: 2022-03-31

## 2022-03-30 RX ORDER — ACETAMINOPHEN 500 MG
975 TABLET ORAL EVERY 8 HOURS
Refills: 0 | Status: DISCONTINUED | OUTPATIENT
Start: 2022-03-31 | End: 2022-03-31

## 2022-03-30 RX ORDER — PANTOPRAZOLE SODIUM 20 MG/1
1 TABLET, DELAYED RELEASE ORAL
Qty: 30 | Refills: 0
Start: 2022-03-30 | End: 2022-04-28

## 2022-03-30 RX ORDER — SODIUM CHLORIDE 9 MG/ML
1000 INJECTION, SOLUTION INTRAVENOUS
Refills: 0 | Status: DISCONTINUED | OUTPATIENT
Start: 2022-03-31 | End: 2022-03-31

## 2022-03-30 RX ORDER — OXYCODONE HYDROCHLORIDE 5 MG/1
1 TABLET ORAL
Qty: 30 | Refills: 0
Start: 2022-03-30 | End: 2022-04-03

## 2022-03-30 RX ORDER — LABETALOL HCL 100 MG
10 TABLET ORAL ONCE
Refills: 0 | Status: COMPLETED | OUTPATIENT
Start: 2022-03-30 | End: 2022-03-30

## 2022-03-30 RX ORDER — POLYETHYLENE GLYCOL 3350 17 G/17G
17 POWDER, FOR SOLUTION ORAL
Qty: 1 | Refills: 0
Start: 2022-03-30 | End: 2022-04-12

## 2022-03-30 RX ORDER — ASPIRIN/CALCIUM CARB/MAGNESIUM 324 MG
325 TABLET ORAL DAILY
Refills: 0 | Status: DISCONTINUED | OUTPATIENT
Start: 2022-04-01 | End: 2022-03-31

## 2022-03-30 RX ORDER — SENNA PLUS 8.6 MG/1
2 TABLET ORAL
Qty: 28 | Refills: 0
Start: 2022-03-30 | End: 2022-04-12

## 2022-03-30 RX ORDER — FENTANYL CITRATE 50 UG/ML
50 INJECTION INTRAVENOUS
Refills: 0 | Status: DISCONTINUED | OUTPATIENT
Start: 2022-03-30 | End: 2022-03-30

## 2022-03-30 RX ORDER — ACETAMINOPHEN 500 MG
2 TABLET ORAL
Qty: 84 | Refills: 0
Start: 2022-03-30 | End: 2022-04-12

## 2022-03-30 RX ORDER — HYDROMORPHONE HYDROCHLORIDE 2 MG/ML
0.5 INJECTION INTRAMUSCULAR; INTRAVENOUS; SUBCUTANEOUS EVERY 4 HOURS
Refills: 0 | Status: DISCONTINUED | OUTPATIENT
Start: 2022-03-31 | End: 2022-03-31

## 2022-03-30 RX ADMIN — Medication 10 MILLIGRAM(S): at 21:40

## 2022-03-30 RX ADMIN — LISINOPRIL 20 MILLIGRAM(S): 2.5 TABLET ORAL at 23:20

## 2022-03-30 RX ADMIN — SENNA PLUS 2 TABLET(S): 8.6 TABLET ORAL at 23:21

## 2022-03-30 RX ADMIN — Medication 975 MILLIGRAM(S): at 23:18

## 2022-03-30 RX ADMIN — Medication 975 MILLIGRAM(S): at 05:16

## 2022-03-30 RX ADMIN — Medication 975 MILLIGRAM(S): at 05:06

## 2022-03-30 RX ADMIN — Medication 975 MILLIGRAM(S): at 13:12

## 2022-03-30 RX ADMIN — Medication 0.1 MILLIGRAM(S): at 23:20

## 2022-03-30 RX ADMIN — Medication 0.1 MILLIGRAM(S): at 10:34

## 2022-03-30 RX ADMIN — LISINOPRIL 20 MILLIGRAM(S): 2.5 TABLET ORAL at 10:33

## 2022-03-30 RX ADMIN — ESCITALOPRAM OXALATE 10 MILLIGRAM(S): 10 TABLET, FILM COATED ORAL at 10:34

## 2022-03-30 RX ADMIN — Medication 975 MILLIGRAM(S): at 23:48

## 2022-03-30 RX ADMIN — ATORVASTATIN CALCIUM 10 MILLIGRAM(S): 80 TABLET, FILM COATED ORAL at 23:18

## 2022-03-30 NOTE — CONSULT NOTE ADULT - ASSESSMENT
This is a87 year old female with a mechanical fall in [place]  on 3-.  Pt found to have a left displaced comminuted humeral neck fracture. Pt is awaiting orthopedic surgical intervention today. Discussed with pt the need for prophylactic anticoagulation post procedure.  Pt has moderate thrombotic risks.    plan:   Hold pharmacological ac until after procedure, will use either lovenox or aspirin post procedure will reevaluate pt.  : le venpardeepes  :oob as per ortho  Thanks for consult will f/u

## 2022-03-30 NOTE — PROGRESS NOTE ADULT - SUBJECTIVE AND OBJECTIVE BOX
Pt seen and examined at bedside, resting comfortably. No acute events overnight. Ready for OR later today. Denies numbness/tingling, chest pain, SOB.    T(C): 37.2 (03-30-22 @ 01:11), Max: 37.2 (03-30-22 @ 01:11)  HR: 73 (03-30-22 @ 01:11) (70 - 82)  BP: 145/58 (03-30-22 @ 01:11) (145/58 - 160/53)  RR: 18 (03-30-22 @ 01:11) (17 - 18)  SpO2: 92% (03-30-22 @ 01:11) (92% - 100%)                          7.7    6.34  )-----------( 179      ( 30 Mar 2022 03:43 )             24.7     30 Mar 2022 03:43    143    |  109    |  16     ----------------------------<  88     3.6     |  29     |  0.38     Ca    8.4        30 Mar 2022 03:43    TPro  x      /  Alb  2.7    /  TBili  x      /  DBili  x      /  AST  x      /  ALT  x      /  AlkPhos  x      30 Mar 2022 03:43      Physical Exam:  Gen: NAD, A&Ox3    RUE:  Skin intact, some ecchymosis  SILT C5-T1  + Musc/Axillary/Median/Ulnar/Radial/AIN/PIN motor intact  + Radial pulse  Compartments soft, compressible    A/P: 87y Female with Right proximal humerus fracture    -NPO after breakfast (0900)  -Plan for OR today for reverse total shoulder arthroplasty after 5pm  -Pt has signed consent  -Pain control  -Sling in place  -NWB RUE   -Medically cleared for surgery  -Low H/H today, will likely receive transfusion after discussion with Dr. Islas  -Above as discussed with/directed by Dr Roshan Wong, DO  PGY-3, Orthopaedic Surgery

## 2022-03-30 NOTE — CONSULT NOTE ADULT - SUBJECTIVE AND OBJECTIVE BOX
HPI:  87y Female PMH HTN HLD anxiety resides in [place] and RHD presents c/o Right shoulder pain sp mechanical fall 3 days ago on 3-. Pt was on vacation w family in [place] and pt mis-stepped the last step at the bottom of the staircase and fell forward onto her right shoulder. She had immediate pain to the shoulder. Fall was witnessed by her step-daughter. She was seen at local hospital there and treated in a sling. The step-daughter is familiar w Dr Islas and decided to bring her step-mother her to the ED for evaluation. And xray was done in the ED today and Orthopedics was called to manage. Pt was seen, had a CT scan all reviewed by DR Islas who recommends reverse shoulder arthroplasty to regain function asap. Pt wishes to have surgery here and once recovered return to [place] where she lives alone and cares for herself. She does not drive due to macular degeneration and uses no assistive devices. She is otherwise very active. Denies HS/LOC. Denies numbness/tingling. Denies fever/chills. Denies pain/injury elsewhere. No other complaints. Pt seen and treated by the Orthopedic PA/Resident Team.       Patient is a 87y old  Female who presents with a chief complaint of RIght humerus fracture (30 Mar 2022 05:46)      Consulted by Dr. Mat Islas  for VTE prophylaxis, risk stratification, and anticoagulation management.    PAST MEDICAL & SURGICAL HISTORY:  Macular degeneration    Anxiety    HTN (hypertension)        FAMILY HISTORY:      Interval Note:  3- Pt seen at bedside on 2north with daughter present.  Discussed with pt she will need prophylactic anticoagulation post procedure either Lovenox or enteric coated aspirin.  Pt states she prefers aspirin but will re evaluate pt post procedure. Benefits and risks discussed.         CAPRINI SCORE  AGE RELATED RISK FACTORS                                                       MOBILITY RELATED FACTORS  [ ] Age 41-60 years                                            (1 Point)                  [ ] Bed rest                                                        (1 Point)  [ ] Age: 61-74 years                                           (2 Points)                [ ] Plaster cast                                                   (2 Points)  [x ] Age= 75 years                                              (3 Points)                 [ ] Bed bound for more than 72 hours                   (2 Points)    DISEASE RELATED RISK FACTORS                                               GENDER SPECIFIC FACTORS  [ ] Edema in the lower extremities                       (1 Point)           [ ] Pregnancy                                                            (1 Point)  [ ] Varicose veins                                               (1 Point)                  [ ] Post-partum < 6 weeks                                      (1 Point)             [ ] BMI > 25 Kg/m2                                            (1 Point)                  [ ] Hormonal therapy or oral contraception       (1 Point)                 [ ] Sepsis (in the previous month)                        (1 Point)             [ ] History of pregnancy complications                (1Point)  [ ] Pneumonia or serious lung disease                                             [ ] Unexplained or recurrent  (=/>3), premature                                 (In the previous month)                               (1 Point)                birth with toxemia or growth-restricted infant (1 Point)  [ ] Abnormal pulmonary function test            (1 Point)                                   SURGERY RELATED RISK FACTORS  [ ] Acute myocardial infarction                       (1 Point)                  [ ]  Section                                         (1 Point)  [ ] Congestive heart failure (in the previous month) (1 Point)   [ ] Minor surgery   lasting <45 minutes       (1 Point)   [ ] Inflammatory bowel disease                             (1 Point)          [ ] Arthroscopic surgery                                  (2 Points)  [ ] Central venous access                                    (2 Points)            [ x] General surgery lasting >45 minutes      (2 Points)       [ ] Stroke (in the previous month)                  (5 Points)            [ ] Elective major lower extremity arthroplasty (5 Points)                                   [  ] Malignancy (present or past include skin melanoma                                          but exclude  basal skin cell)    (2 points)                                      TRAUMA RELATED RISK FACTORS                HEMATOLOGY RELATED FACTORS                                  [ ] Fracture of the hip, pelvis, or leg                       (5 Points)  [ ] Prior episodes of VTE                                     (3 Points)          [ ] Acute spinal cord injury (in the previous month)  (5 Points)  [ ] Positive family history for VTE                         (3 Points)       [ ] Paralysis (less than 1 month)                          (5 Points)  [ ] Prothrombin 75907 A                                      (3 Points)         [ ] Multiple Trauma (within 1month)                 (5Points)                                                                                                                                                                [ ] Factor V Leiden                                          (3 Points)                                OTHER RISK FACTORS                          [ ] Lupus anticoagulants                                     (3 Points)                       [ ] BMI > 40                          (1 Point)                                                         [ ] Anticardiolipin antibodies                                (3 Points)                   [ ] Smoking                              (1Point)                                                [ ] High homocysteine in the blood                      (3 Points)                [  ] Diabetes requiring insulin (1point)                         [ ] Other congenital or acquired thrombophilia       (3 Points)          [  ] Chemotherapy                   (1 Point)  [ ] Heparin induced thrombocytopenia                  (3 Points)             [  ] Blood Transfusion                (1 point)                                                                                                             Total Score [5]                                                                                                                                                                                                                                                                                                                                                                                                                                           IMPROVE Bleeding Risk Score: 3.5    Falls Risk:   High (  )  Mod (x  )  Low (  )  crcl: 71.9        cr: .39         BMI 17.6            EBL: pending  ml  Time procedure    : starts: pending             :ends: pending      Tourniquet time:        Denies any personal or familial history of clotting or bleeding disorders.    Allergies    Nickel (Rash)  No Known Drug Allergies    Intolerances        REVIEW OF SYSTEMS    (  )Fever	     (  )Constipation	(  )SOB				(  )Headache	(  )Dysuria  (  )Chills	     (  )Melena	(  )Dyspnea present on exertion	                    (  )Dizziness                    (  )Polyuria  (  )Nausea	     (  )Hematochezia	(  )Cough			                    (  )Syncope   	(  )Hematuria  (  )Vomiting    (  )Chest Pain	(  )Wheezing			(  )Weakness  (  )Diarrhea     (  )Palpitations	(  )Anorexia			(x  )Myalgia  (x) arm pain    Pertinent positives in HPI and daily subjective.  All other ROS negative.      Vital Signs Last 24 Hrs  T(C): 37.1 (30 Mar 2022 10:15), Max: 37.2 (30 Mar 2022 01:11)  T(F): 98.8 (30 Mar 2022 10:15), Max: 99 (30 Mar 2022 01:11)  HR: 84 (30 Mar 2022 10:15) (73 - 84)  BP: 150/61 (30 Mar 2022 10:15) (132/67 - 160/53)  BP(mean): 96 (29 Mar 2022 16:48) (96 - 96)  RR: 18 (30 Mar 2022 10:15) (17 - 18)  SpO2: 99% (30 Mar 2022 10:15) (92% - 100%)    PHYSICAL EXAM:    Constitutional: Appears Well    Neurological: A& O x 3    Skin: Warm    Respiratory and Thorax: normal effort; Breath sounds: normal; No rales/wheezing/rhonchi  	  Cardiovascular: S1, S2, regular, NMBR	    Gastrointestinal: BS + x 4Q, nontender	    Genitourinary:  Bladder nondistended, nontender    Musculoskeletal:   General Right:   no muscle/joint tenderness,   normal tone, no joint swelling,   ROM: limited	    General Left:   no muscle/joint tenderness,   normal tone, no joint swelling,   ROM: full        Shoulder:  Rt: swollen ecchymotic,  Sling/immob. noted; Cap refill good; Radial pulse +; Sensation intact                   Lower extrems:   Right: no calf tenderness              negative cristy's sign               + pedal pulses    Left:   no calf tenderness              negative cristy's sign               + pedal pulses                          7.7    6.34  )-----------( 179      ( 30 Mar 2022 03:43 )             24.7       0330    143  |  109<H>  |  16  ----------------------------<  88  3.6   |  29  |  0.38<L>    Ca    8.4<L>      30 Mar 2022 03:43    TPro  x   /  Alb  2.7<L>  /  TBili  x   /  DBili  x   /  AST  x   /  ALT  x   /  AlkPhos  x   03-30      PT/INR - ( 30 Mar 2022 03:43 )   PT: 12.5 sec;   INR: 1.08 ratio         PTT - ( 30 Mar 2022 03:43 )  PTT:26.2 sec				  < from: CT Shoulder No Cont, Right (22 @ 13:33) >  IMPRESSION:  1. Displaced comminuted humeral neck fracture is again seen with one bone   width anterior and superior displacement of the diaphysis that is   positioned below the coracoid process.  2. There is comminution of the inferior margin of the lesser tuberosity   and slightly displaced fracture component involves the greater tuberosity.    < end of copied text >    MEDICATIONS  (STANDING):  acetaminophen     Tablet .. 975 milliGRAM(s) Oral every 8 hours  atorvastatin Oral Tab/Cap - Peds 10 milliGRAM(s) Oral at bedtime  cloNIDine 0.1 milliGRAM(s) Oral two times a day  escitalopram 10 milliGRAM(s) Oral daily  lactated ringers. 1000 milliLiter(s) IV Continuous <Continuous>  lisinopril 20 milliGRAM(s) Oral two times a day  pantoprazole    Tablet 40 milliGRAM(s) Oral before breakfast  senna 2 Tablet(s) Oral at bedtime          DVT Prophylaxis:  LMWH                   (hold  )  Heparin SQ           (  )  Coumadin             (  )  Xarelto                  (  )  Eliquis                   (  )  Venodynes           (  )  Ambulation          (  )  UFH                       (  )  Contraindicated  (  )  EC Aspirin             (  )

## 2022-03-30 NOTE — PROGRESS NOTE ADULT - SUBJECTIVE AND OBJECTIVE BOX
Orthopedics Post-op Check    POD 0 Right reverse TSA  Pt Seen and Examined. Pain is controlled. Feeling well overall. No nausea or vomiting. full sensation not returned since block    Vital Signs Last 24 Hrs  T(C): 36.8 (03-30-22 @ 21:07), Max: 37.2 (03-30-22 @ 01:11)  T(F): 98.3 (03-30-22 @ 21:07), Max: 99 (03-30-22 @ 01:11)  HR: 85 (03-30-22 @ 22:00) (73 - 98)  BP: 148/53 (03-30-22 @ 22:00) (132/67 - 189/98)  BP(mean): --  RR: 14 (03-30-22 @ 22:00) (14 - 18)  SpO2: 93% (03-30-22 @ 22:00) (92% - 100%)                        9.9    9.10  )-----------( 184      ( 30 Mar 2022 21:51 )             30.2     30 Mar 2022 03:43    143    |  109    |  16     ----------------------------<  88     3.6     |  29     |  0.38     Ca    8.4        30 Mar 2022 03:43    TPro  x      /  Alb  2.7    /  TBili  x      /  DBili  x      /  AST  x      /  ALT  x      /  AlkPhos  x      30 Mar 2022 03:43    PT/INR - ( 30 Mar 2022 12:35 )   PT: 12.6 sec;   INR: 1.09 ratio         PTT - ( 30 Mar 2022 03:43 )  PTT:26.2 sec    Exam:    Gen: NAD  RUE:  Dressing clean D&I  In Shldr Immobilzier  sensation returning to fingers, still no sensation proximal to wrist, unable to move fingers yet at this time will re eval  +radial pulse  Soft compartments, - calf tenderness      A/P:   87yFemale s/p R Reverse TSA    -FU AM Labs  -Will Re eval motor/sensory after block wares off  -RUE SHOULDER IMMOBILIZER AT ALL TIMES  -NO R SHOULDER ROM   -RUE NWB  -AC consult  -NO DRESSING CHANGES PER DR. DUKES  -Pain control  -DVT/PE ppx per AC  - PT cs  -Med Mgmt  - D/C Planning

## 2022-03-30 NOTE — DISCHARGE NOTE PROVIDER - CARE PROVIDER_API CALL
Mat Islas)  Orthopaedic Surgery  [address] [place] [address] [place] [address]  Phone: [phone]  Fax: [phone]  Follow Up Time:

## 2022-03-30 NOTE — DISCHARGE NOTE PROVIDER - HOSPITAL COURSE
H&P:  Pt is a 87y Female    PAST MEDICAL & SURGICAL HISTORY:  Macular degeneration    Anxiety    HTN (hypertension)         Now s/p Right Reverse Total Shoulder Arthroplasty for Proximal Humerus Fracture. Pt is afebrile with stable vital signs. Pain is controlled. Alert and Oriented. Exam reveals intact AIN/PIN, wrist flexion and wrist extension, 2+Radial Pulse. Dressing is clean and dry with telfa/tegaderm on.    Hospital Course:  Patient presented to [facility] ED after a fall resulting in a proximal humerus fracture. Pt was admitted to Orthopedics service and was evaluated and medically cleared for Right Reverese Total Shoulder Replacement Surgery. Prophylactic antibiotics were started before the procedure and continued for 24 hours. They were admitted after surgery to the orthopedic floor.   There were no complications during the hospital stay.     Routine consults were obtained from the Anticoagulation Team for DVT/PE prophylaxis, from Physical Therapy for twice daily PT ROM limited to full forward elevation, Internal rotation to Chest, external rotation to neutral, no extension. Consult to Hospitalist for Medical Co-management. Patient was placed on anticoagulation medication per Anticoagulation team recommendations.  Pertinent home medications were continued.  Daily labs were followed.      On POD 0 there were no overnight events and the pt was OOB with PT. POD 1, PT was continued, and anticipate POD 1 DC to home versus rehab pending PT recommendations. The orthopedic Attending is aware and agrees.

## 2022-03-30 NOTE — PROGRESS NOTE ADULT - SUBJECTIVE AND OBJECTIVE BOX
c/c: right humerus fracture, RUE pain    HPI: 87F, pmh of HTN, HLD, Anxiety, Mitral valve prolapse,LBBB, Macular degeneration, cataracts who underwent medical clearance in prep for cataract surgery, but cancelled d/t the pandemic and she has since opted out of the procedure who presented to the ED with a right proximal humerus fracture. She lives in [place] she was coming downstairs, misjudged the last step as it was the same color as the sidewalk and fell onto her right UE on 3/25.   She went to a local ED, had xrays and was told to have a proximal humerus fracture. She was put in a sling and advised outpt f/u with her outpatient orthopedics who she could not get an appt with for 3 weeks. Her family flew her up here and she is now being admitted for surgery.   She denies any sob/chest pains. Walks every day. Has no trouble walking a few blocks or climbing a flight of stairs. No recent f/c/r. No cough/sputum/dysuria.   Not on blood thinners, does not bleed excessively. Has never had general anesthesia before.   Had some n/v afterlleaving ER in [place] which lasted about 1 day. No parasthesias. no focal weakness. Denies h/o cardiac disease or stroke. Last stress test was >10 years ago.   Last ate around 10 am 3/29.      3/30: pt seen at bedside, awake, alert, right arm in sling, presently comfortable, NPO for surgery this afternoon,  + voiding, no cp or sob    ROS: all 10 systems reviewed and is as above otherwise negative.         PHYSICAL EXAM:    GENERAL: Comfortable, no acute distress   HEAD:  Normocephalic, atraumatic  EYES: EOMI, PERRLA  HEENT: Moist mucous membranes  NECK: Supple, No JVD  NERVOUS SYSTEM:  Alert & Oriented X3, non focal.   CHEST/LUNG: Clear to auscultation bilaterally  HEART: Regular rate and rhythm  ABDOMEN: Soft, Nontender, Nondistended, Bowel sounds present  GENITOURINARY: Voiding, no palpable bladder  EXTREMITIES:   No clubbing, cyanosis, or edema  MUSCULOSKELETAL- RUE sling. +ecchymosis/swelling with underlying hematoma.   SKIN-no rash    LABS:                        9.6    9.73  )-----------( 200      ( 29 Mar 2022 15:03 )             29.6     03-29    142  |  107  |  19  ----------------------------<  102<H>  3.5   |  29  |  0.53    Ca    8.8      29 Mar 2022 15:03    TPro  7.0  /  Alb  3.5  /  TBili  0.9  /  DBili  x   /  AST  20  /  ALT  22  /  AlkPhos  61  03-29    PT/INR - ( 29 Mar 2022 15:03 )   PT: 12.2 sec;   INR: 1.05 ratio         PTT - ( 29 Mar 2022 15:03 )  PTT:26.5 sec    CT 3D Reconstruct w/o Workstation (03.29.22 @ 13:33) >  IMPRESSION:  1. Displaced comminuted humeral neck fracture is again seen with one bone   width anterior and superior displacement of the diaphysis that is   positioned below the coracoid process.  2. There is comminution of the inferior margin of the lesser tuberosity   and slightly displaced fracture component involves the greater tuberosity.  3. Several tiny pulmonary nodules are noted measuring up to approximately   0.2 cm. Correlation with priors if available is suggested.  This may not   warrant additional followup if the patient has no risk factors per the   Fleischner society guidelines.  If the patient has risk factors a   followup chest CT is suggested in 12 months.    EKG: my read-LBBB  MEDICATIONS  (STANDING):  acetaminophen     Tablet .. 975 milliGRAM(s) Oral every 8 hours  heparin SubCutaneous Injection - Peds 5000 Unit(s) SubCutaneous once  lactated ringers. 1000 milliLiter(s) (75 mL/Hr) IV Continuous <Continuous>  pantoprazole    Tablet 40 milliGRAM(s) Oral before breakfast  senna 2 Tablet(s) Oral at bedtime    MEDICATIONS  (PRN):  HYDROmorphone  Injectable 0.5 milliGRAM(s) SubCutaneous every 4 hours PRN Severe Pain (7 - 10)  melatonin 3 milliGRAM(s) Oral at bedtime PRN Insomnia  ondansetron Injectable 4 milliGRAM(s) IV Push every 8 hours PRN Nausea and/or Vomiting  oxyCODONE    IR 5 milliGRAM(s) Oral every 4 hours PRN Mild Pain (1 - 3)  oxyCODONE    IR 10 milliGRAM(s) Oral every 4 hours PRN Moderate Pain (4 - 6)  prochlorperazine   Injectable 10 milliGRAM(s) IntraMuscular every 8 hours PRN Nausea and/or Vomiting      ASSESSMENT AND PLAN:  87f, PMH as above a/w:    1. Subacute displaced Right proximal humerus fracture:  -admitted to ortho.   -pain control with tylenol, tramadol.   -labs/ekg reviewed. LBBB is old per d/w patient.   -no medical contraindications for OR at this time.   -incentive spirometry  -PT eval post op.     2. Anemia:  -could be blood loss related to fracture (has hematoma).   -check iron studies/b12/folate/retic/stool guiac, etc     3. Pulmonary nodules:  -seen on CT shoulder.   -check CT dedicated to chest for further evaluation.     4. HTN:  -continue ace-i/clonidine with hold parameters     5. Anxiety:  -continue lexapro.     6. DVT px:  -hep sq X 1 today if OR tomorrow.       thanks will follow.  d/w family at bedside/ortho           c/c: right humerus fracture, RUE pain    HPI: 87F, pmh of HTN, HLD, Anxiety, Mitral valve prolapse,LBBB, Macular degeneration, cataracts who underwent medical clearance in prep for cataract surgery, but cancelled d/t the pandemic and she has since opted out of the procedure who presented to the ED with a right proximal humerus fracture. She lives in [place] was coming downstairs, misjudged the last step as it was the same color as the sidewalk and fell onto her right UE on 3/25.   She went to a local ED, had xrays and was told to have a proximal humerus fracture. She was put in a sling and advised outpt f/u with her outpatient orthopedics who she could not get an appt with for 3 weeks. Her family flew her up here and she is now being admitted for surgery.   She denies any sob/chest pains. Walks every day. Has no trouble walking a few blocks or climbing a flight of stairs. No recent f/c/r. No cough/sputum/dysuria.   Not on blood thinners, does not bleed excessively. Has never had general anesthesia before.   Had some n/v afterlleaving ER in [place] which lasted about 1 day. No parasthesias. no focal weakness. Denies h/o cardiac disease or stroke. Last stress test was >10 years ago.   Last ate around 10 am 3/29.      3/30: pt seen at bedside, awake, alert, right arm in sling, presently comfortable, NPO for surgery this afternoon,  + voiding, no cp or sob    ROS: all 10 systems reviewed and is as above otherwise negative.         PHYSICAL EXAM:    GENERAL: Comfortable, no acute distress   HEAD:  Normocephalic, atraumatic  EYES: EOMI, PERRLA  HEENT: Moist mucous membranes  NECK: Supple, No JVD  NERVOUS SYSTEM:  Alert & Oriented X3, non focal.   CHEST/LUNG: Clear to auscultation bilaterally  HEART: Regular rate and rhythm  ABDOMEN: Soft, Nontender, Nondistended, Bowel sounds present  GENITOURINARY: Voiding, no palpable bladder  EXTREMITIES:   No clubbing, cyanosis, or edema  MUSCULOSKELETAL- RUE sling. +ecchymosis/swelling with underlying hematoma.   SKIN-no rash    LABS:                        9.6    9.73  )-----------( 200      ( 29 Mar 2022 15:03 )             29.6     03-29    142  |  107  |  19  ----------------------------<  102<H>  3.5   |  29  |  0.53    Ca    8.8      29 Mar 2022 15:03    TPro  7.0  /  Alb  3.5  /  TBili  0.9  /  DBili  x   /  AST  20  /  ALT  22  /  AlkPhos  61  03-29    PT/INR - ( 29 Mar 2022 15:03 )   PT: 12.2 sec;   INR: 1.05 ratio         PTT - ( 29 Mar 2022 15:03 )  PTT:26.5 sec    CT 3D Reconstruct w/o Workstation (03.29.22 @ 13:33) >  IMPRESSION:  1. Displaced comminuted humeral neck fracture is again seen with one bone   width anterior and superior displacement of the diaphysis that is   positioned below the coracoid process.  2. There is comminution of the inferior margin of the lesser tuberosity   and slightly displaced fracture component involves the greater tuberosity.  3. Several tiny pulmonary nodules are noted measuring up to approximately   0.2 cm. Correlation with priors if available is suggested.  This may not   warrant additional followup if the patient has no risk factors per the   Fleischner society guidelines.  If the patient has risk factors a   followup chest CT is suggested in 12 months.    EKG: my read-[facility]    medic    ASSESSMENT AND PLAN:  87f, PMH as above a/w:    1. Subacute displaced Right proximal humerus fracture:  -admitted to ortho.   -pain control with tylenol, tramadol.   -labs/ekg reviewed. LBBB is old per d/w patient.   -no medical contraindications for OR at this time.   -incentive spirometry  -PT eval post op.     2. Anemia:  -could be blood loss related to fracture (has hematoma).   -check iron studies/b12/folate/retic/stool guiac, etc     3. Pulmonary nodules:  -seen on CT shoulder.   -check CT dedicated to chest for further evaluation.     4. HTN:  -continue ace-i/clonidine with hold parameters     5. Anxiety:  -continue lexapro.     6. DVT px:  -hep sq X 1 today if OR tomorrow.       thanks will follow.  d/w family at bedside/ortho           c/c: right humerus fracture, RUE pain    HPI: 87F, pmh of HTN, HLD, Anxiety, Mitral valve prolapse,LBBB, Macular degeneration, cataracts who underwent medical clearance in prep for cataract surgery, but cancelled d/t the pandemic and she has since opted out of the procedure who presented to the ED with a right proximal humerus fracture. She lives in [place] she was coming downstairs, misjudged the last step as it was the same color as the sidewalk and fell onto her right UE on 3/25.   She went to a local ED, had xrays and was told to have a proximal humerus fracture. She was put in a sling and advised outpt f/u with her outpatient orthopedics who she could not get an appt with for 3 weeks. Her family flew her up here and she is now being admitted for surgery.   She denies any sob/chest pains. Walks every day. Has no trouble walking a few blocks or climbing a flight of stairs. No recent f/c/r. No cough/sputum/dysuria.   Not on blood thinners, does not bleed excessively. Has never had general anesthesia before.   Had some n/v afterlleaving ER in [place] which lasted about 1 day. No parasthesias. no focal weakness. Denies h/o cardiac disease or stroke. Last stress test was >10 years ago.   Last ate around 10 am 3/29.      3/30: pt seen at bedside, awake, alert, right arm in sling, presently comfortable, NPO for surgery this afternoon,  + voiding, no cp or sob      ROS: all 10 systems reviewed and is as above otherwise negative.       Vital Signs Last 24 Hrs  T(C): 37.1 (03-30-22 @ 10:15), Max: 37.2 (03-30-22 @ 01:11)  T(F): 98.8 (03-30-22 @ 10:15), Max: 99 (03-30-22 @ 01:11)  HR: 84 (03-30-22 @ 10:15) (73 - 84)  BP: 150/61 (03-30-22 @ 10:15) (132/67 - 160/53)  BP(mean): 96 (03-29-22 @ 16:48) (96 - 96)  RR: 18 (03-30-22 @ 10:15) (17 - 18)  SpO2: 99% (03-30-22 @ 10:15) (92% - 100%)      PHYSICAL EXAM:    GENERAL: Comfortable, no acute distress   HEAD:  Normocephalic, atraumatic  EYES: EOMI, PERRLA  HEENT: Moist mucous membranes  NECK: Supple, No JVD  NERVOUS SYSTEM:  Alert & Oriented X3, non focal.   CHEST/LUNG: Clear to auscultation bilaterally  HEART: Regular rate and rhythm  ABDOMEN: Soft, Nontender, Nondistended, Bowel sounds present  GENITOURINARY: Voiding, no palpable bladder  EXTREMITIES:   No clubbing, cyanosis, or edema  MUSCULOSKELETAL- RUE sling. +ecchymosis/swelling with underlying hematoma.   SKIN-no rash    LABS:                                            7.7    6.34  )-----------( 179      ( 30 Mar 2022 03:43 )             24.7       03-30    143  |  109<H>  |  16  ----------------------------<  88  3.6   |  29  |  0.38<L>    Ca    8.4<L>      30 Mar 2022 03:43    TPro  x   /  Alb  2.7<L>  /  TBili  x   /  DBili  x   /  AST  x   /  ALT  x   /  AlkPhos  x   03-30      PT/INR - ( 30 Mar 2022 03:43 )   PT: 12.5 sec;   INR: 1.08 ratio         PTT - ( 30 Mar 2022 03:43 )  PTT:26.2 sec      CT 3D Reconstruct w/o Workstation (03.29.22 @ 13:33) >  IMPRESSION:  1. Displaced comminuted humeral neck fracture is again seen with one bone   width anterior and superior displacement of the diaphysis that is   positioned below the coracoid process.  2. There is comminution of the inferior margin of the lesser tuberosity   and slightly displaced fracture component involves the greater tuberosity.  3. Several tiny pulmonary nodules are noted measuring up to approximately   0.2 cm. Correlation with priors if available is suggested.  This may not   warrant additional followup if the patient has no risk factors per the   Fleischner society guidelines.  If the patient has risk factors a   followup chest CT is suggested in 12 months.    EKG: my read-LBBB    medic    ASSESSMENT AND PLAN:  87f, PMH as above a/w:    1. Subacute displaced Right proximal humerus fracture:  -admitted to ortho.   -pain control with tylenol, tramadol.   -labs/ekg reviewed. LBBB is old per d/w patient.   -no medical contraindications for OR at this time.   -incentive spirometry  -PT eval post op.     2. Anemia:  -could be blood loss related to fracture (has hematoma).   -check iron studies/b12/folate/retic/stool guiac, etc   -transfuse    3. Pulmonary nodules:  -seen on CT shoulder.   -check CT dedicated to chest for further evaluation.     4. HTN:  -continue ace-i/clonidine with hold parameters     5. Anxiety:  -continue lexapro.     6. DVT px:  hold chemical prophylaxis until post op  AC consult post op    dispo: OR today

## 2022-03-30 NOTE — DISCHARGE NOTE PROVIDER - NSDCMRMEDTOKEN_GEN_ALL_CORE_FT
acetaminophen 500 mg oral tablet: 2 tab(s) orally every 6 hours, As Needed  atorvastatin 10 mg oral tablet: 1 tab(s) orally once a day  cloNIDine 0.1 mg oral tablet: 1 tab(s) orally 2 times a day  escitalopram 10 mg oral tablet: 1 tab(s) orally once a day  lisinopril 20 mg oral tablet: 1 tab(s) orally 2 times a day  MiraLax oral powder for reconstitution: 17 gram(s) orally once a day  as needed for constipation  Norvasc 2.5 mg oral tablet: 1 tab(s) orally once a day  oxyCODONE 5 mg oral tablet: 1 tab(s) orally every 4 hours as needed for severe pain; MDD:6  pantoprazole 40 mg oral delayed release tablet: 1 tab(s) orally once a day   senna oral tablet: 2 tab(s) orally once a day (at bedtime)   Tylenol Extra Strength 500 mg oral tablet: 2 tab(s) orally 3 times a day    Aspirin Enteric Coated 325 mg oral delayed release tablet: 1 tab(s) orally once a day MDD:1  atorvastatin 10 mg oral tablet: 1 tab(s) orally once a day  cloNIDine 0.1 mg oral tablet: 1 tab(s) orally 2 times a day  escitalopram 10 mg oral tablet: 1 tab(s) orally once a day  lisinopril 20 mg oral tablet: 1 tab(s) orally 2 times a day  MiraLax oral powder for reconstitution: 17 gram(s) orally once a day  as needed for constipation  Norvasc 2.5 mg oral tablet: 1 tab(s) orally once a day  pantoprazole 40 mg oral delayed release tablet: 1 tab(s) orally once a day   senna oral tablet: 2 tab(s) orally once a day (at bedtime)   traMADol 50 mg oral tablet: 1 tab(s) orally every 6 hours, As Needed  -for severe pain MDD:4  Tylenol Extra Strength 500 mg oral tablet: 2 tab(s) orally 3 times a day

## 2022-03-30 NOTE — DISCHARGE NOTE PROVIDER - NSDCFUADDINST_GEN_ALL_CORE_FT
Discharge Instructions for Right Reverse Total Shoulder Arthroplasty:     1. Activity: No Aggressive ROM until cleared by Dr. Islas. Maintain sling at all times EXCEPT to straighten elbow a few times daily. Elevate hand and wiggle fingers. Do not start any outpatient PT until you see Dr. Islas in the office.    2. Call with fever over 101, wound redness, drainage.    3. Wound care: Do not remove or change dressing unless saturated.  IF so, apply dry gauze, tegaderm. Be careful not to removed mesh that is glued to the wound. There are no sutures or staples to remove.    4. Continue to apply ICE packs.    5. DVT PE Prophylaxis: per AC Team. See med Rec.    6. Follow Up: Orthopedics, Dr. ISLAS  10-14 days in office. Call to schedule. If going home, eRx sent to your pharmacy for .   Discharge Instructions for Right Reverse Total Shoulder Arthroplasty:     1. Activity: No ROM until cleared by Dr. Islas. Maintain sling at all times EXCEPT to straighten elbow a few times daily. Elevate hand and wiggle fingers. Do not start any outpatient PT until you see Dr. Islas in the office.    2. Call with fever over 101, wound redness, drainage.    3. Wound care: Do not remove or change dressing unless Approved by Dr. Islas.  IF so, apply dry gauze, tegaderm. Be careful not to removed mesh that is glued to the wound. There are no sutures or staples to remove.    4. Continue to apply ICE packs.    5. DVT PE Prophylaxis: per AC Team. See med Rec.    6. Follow Up: Orthopedics, Dr. ISLAS  10-14 days in office. Call to schedule. If going home, eRx sent to your pharmacy for .   Discharge Instructions for Right Reverse Total Shoulder Arthroplasty:     1. Activity: No ROM until cleared by Dr. Islas. Maintain sling at all times EXCEPT to straighten elbow a few times daily. Elevate hand and wiggle fingers. Do not start any outpatient PT until you see Dr. Islas in the office.    2. Call with fever over 101, wound redness, drainage.    3. Wound care: Do not remove or change dressing unless Approved by Dr. Islas.  IF so, apply dry gauze, tegaderm. Be careful not to removed mesh that is glued to the wound. There are no sutures or staples to remove.    4. Continue to apply ICE packs.    5. DVT PE Prophylaxis: ECASA 325 per AC Team. See med Rec.    6. Follow Up: Orthopedics, Dr. ISLAS  10-14 days in office. Call to schedule. If going home, eRx sent to your pharmacy for .

## 2022-03-30 NOTE — DISCHARGE NOTE PROVIDER - NSDCCPCAREPLAN_GEN_ALL_CORE_FT
PRINCIPAL DISCHARGE DIAGNOSIS  Diagnosis: Displaced fracture of proximal end of right humerus  Assessment and Plan of Treatment:

## 2022-03-31 VITALS
DIASTOLIC BLOOD PRESSURE: 55 MMHG | OXYGEN SATURATION: 100 % | RESPIRATION RATE: 17 BRPM | TEMPERATURE: 99 F | HEART RATE: 78 BPM | SYSTOLIC BLOOD PRESSURE: 166 MMHG

## 2022-03-31 LAB
ANION GAP SERPL CALC-SCNC: 6 MMOL/L — SIGNIFICANT CHANGE UP (ref 5–17)
BUN SERPL-MCNC: 13 MG/DL — SIGNIFICANT CHANGE UP (ref 7–23)
CALCIUM SERPL-MCNC: 8.3 MG/DL — LOW (ref 8.5–10.1)
CHLORIDE SERPL-SCNC: 108 MMOL/L — SIGNIFICANT CHANGE UP (ref 96–108)
CO2 SERPL-SCNC: 28 MMOL/L — SIGNIFICANT CHANGE UP (ref 22–31)
CREAT SERPL-MCNC: 0.64 MG/DL — SIGNIFICANT CHANGE UP (ref 0.5–1.3)
EGFR: 87 ML/MIN/1.73M2 — SIGNIFICANT CHANGE UP
GLUCOSE SERPL-MCNC: 224 MG/DL — HIGH (ref 70–99)
HCT VFR BLD CALC: 28 % — LOW (ref 34.5–45)
HGB BLD-MCNC: 8.9 G/DL — LOW (ref 11.5–15.5)
MCHC RBC-ENTMCNC: 31.1 PG — SIGNIFICANT CHANGE UP (ref 27–34)
MCHC RBC-ENTMCNC: 31.8 GM/DL — LOW (ref 32–36)
MCV RBC AUTO: 97.9 FL — SIGNIFICANT CHANGE UP (ref 80–100)
PLATELET # BLD AUTO: 190 K/UL — SIGNIFICANT CHANGE UP (ref 150–400)
POTASSIUM SERPL-MCNC: 4.2 MMOL/L — SIGNIFICANT CHANGE UP (ref 3.5–5.3)
POTASSIUM SERPL-SCNC: 4.2 MMOL/L — SIGNIFICANT CHANGE UP (ref 3.5–5.3)
RBC # BLD: 2.86 M/UL — LOW (ref 3.8–5.2)
RBC # FLD: 14 % — SIGNIFICANT CHANGE UP (ref 10.3–14.5)
SODIUM SERPL-SCNC: 142 MMOL/L — SIGNIFICANT CHANGE UP (ref 135–145)
WBC # BLD: 9.38 K/UL — SIGNIFICANT CHANGE UP (ref 3.8–10.5)
WBC # FLD AUTO: 9.38 K/UL — SIGNIFICANT CHANGE UP (ref 3.8–10.5)

## 2022-03-31 PROCEDURE — 99232 SBSQ HOSP IP/OBS MODERATE 35: CPT

## 2022-03-31 PROCEDURE — 99231 SBSQ HOSP IP/OBS SF/LOW 25: CPT

## 2022-03-31 PROCEDURE — 99024 POSTOP FOLLOW-UP VISIT: CPT

## 2022-03-31 RX ORDER — ACETAMINOPHEN 500 MG
2 TABLET ORAL
Qty: 0 | Refills: 0 | DISCHARGE

## 2022-03-31 RX ORDER — ASPIRIN/CALCIUM CARB/MAGNESIUM 324 MG
1 TABLET ORAL
Qty: 12 | Refills: 0
Start: 2022-03-31 | End: 2022-04-11

## 2022-03-31 RX ORDER — ONDANSETRON 8 MG/1
4 TABLET, FILM COATED ORAL EVERY 8 HOURS
Refills: 0 | Status: DISCONTINUED | OUTPATIENT
Start: 2022-03-31 | End: 2022-03-31

## 2022-03-31 RX ORDER — CEFAZOLIN SODIUM 1 G
2000 VIAL (EA) INJECTION EVERY 8 HOURS
Refills: 0 | Status: COMPLETED | OUTPATIENT
Start: 2022-03-31 | End: 2022-03-31

## 2022-03-31 RX ORDER — TRAMADOL HYDROCHLORIDE 50 MG/1
1 TABLET ORAL
Qty: 15 | Refills: 0
Start: 2022-03-31 | End: 2022-04-06

## 2022-03-31 RX ADMIN — Medication 100 MILLIGRAM(S): at 05:13

## 2022-03-31 RX ADMIN — CELECOXIB 200 MILLIGRAM(S): 200 CAPSULE ORAL at 09:31

## 2022-03-31 RX ADMIN — Medication 800 UNIT(S): at 09:32

## 2022-03-31 RX ADMIN — Medication 975 MILLIGRAM(S): at 05:40

## 2022-03-31 RX ADMIN — Medication 100 MILLIGRAM(S): at 12:03

## 2022-03-31 RX ADMIN — LISINOPRIL 20 MILLIGRAM(S): 2.5 TABLET ORAL at 09:31

## 2022-03-31 RX ADMIN — PANTOPRAZOLE SODIUM 40 MILLIGRAM(S): 20 TABLET, DELAYED RELEASE ORAL at 05:10

## 2022-03-31 RX ADMIN — Medication 975 MILLIGRAM(S): at 05:10

## 2022-03-31 RX ADMIN — Medication 0.1 MILLIGRAM(S): at 09:32

## 2022-03-31 RX ADMIN — ESCITALOPRAM OXALATE 10 MILLIGRAM(S): 10 TABLET, FILM COATED ORAL at 09:32

## 2022-03-31 RX ADMIN — CELECOXIB 200 MILLIGRAM(S): 200 CAPSULE ORAL at 09:40

## 2022-03-31 RX ADMIN — SODIUM CHLORIDE 75 MILLILITER(S): 9 INJECTION, SOLUTION INTRAVENOUS at 04:12

## 2022-03-31 NOTE — PROGRESS NOTE ADULT - ASSESSMENT
This is a87 year old female with a mechanical fall in [place]  on 3-.  Pt found to have a left displaced comminuted humeral neck fracture. Pt is awaiting orthopedic surgical intervention today. Discussed with pt the need for prophylactic anticoagulation post procedure.  Pt has moderate thrombotic risks.    plan:   Hold pharmacological ac until after procedure, will use either lovenox or aspirin post procedure will reevaluate pt.  : le venpardeepes  :oob as per ortho  Thanks for consult will f/u    This is a87 year old female with a mechanical fall in [place]  on 3-.  Pt found to have a left displaced comminuted humeral neck fracture. Pt is awaiting orthopedic surgical intervention today. Discussed with pt the need for prophylactic anticoagulation post procedure.  Pt has moderate thrombotic risks.    plan:   : START ON ASPIRIN 325MG ENTERIC COATED 325MG ONE TAB TWICE A DAY FOR 14 DAYS. Discussed with pt she is aware. .  : effie holloway  :oob as per ortho   will sign off case. please reconsult if needed.   dispo home today

## 2022-03-31 NOTE — PHYSICAL THERAPY INITIAL EVALUATION ADULT - BED MOBILITY LIMITATIONS, REHAB EVAL
History  Chief Complaint   Patient presents with    Shortness of Breath     Pt presents via EMS, coming from [address]  Pt states she has been progessively SOB for two days  pt had an abnomal chest Xray  denies CP, or cough  hx of CHF, afib     70 yr female sent from local long term care facility - wears o2 nc- since 1/21 covid-- c/o 2 days fo increasing sob- had abnormal cxr at [facility] -- pt denies any fevers/uti/cough / no cp- no melena normal amount of urine output-       History provided by:  Patient   used: No    Shortness of Breath  Associated symptoms: no cough and no wheezing        Prior to Admission Medications   Prescriptions Last Dose Informant Patient Reported? Taking? albuterol (PROVENTIL HFA,VENTOLIN HFA) 90 mcg/act inhaler   Yes No   Sig: Inhale 2 puffs every 6 (six) hours as needed   budesonide-formoterol (SYMBICORT) 80-4 5 MCG/ACT inhaler   Yes No   Sig: Inhale 2 puffs 2 (two) times a day Rinse mouth after use     digoxin (LANOXIN) 0 125 mg tablet   Yes No   Sig: Take 0 125 mg by mouth daily   ferrous sulfate (EQL Iron Supplement Therapy) 325 (65 Fe) mg tablet   No No   Sig: Take 1 tablet (325 mg total) by mouth daily with breakfast   furosemide (LASIX) 40 mg tablet   Yes No   Sig: Take 40 mg by mouth 2 (two) times a day   melatonin 3 mg   No No   Sig: Take 1 tablet (3 mg total) by mouth daily at bedtime   montelukast (SINGULAIR) 10 mg tablet   Yes No   Sig: Take 1 tablet by mouth daily   nystatin (MYCOSTATIN) powder   No No   Sig: Apply topically 2 (two) times a day Apply to skin folds   omeprazole (PriLOSEC) 20 mg delayed release capsule   Yes No   Sig: Take 20 mg by mouth daily   potassium chloride (K-DUR,KLOR-CON) 10 mEq tablet   Yes No   Sig: Take 20 mEq by mouth 2 (two) times a day   senna-docusate sodium (Senokot S) 8 6-50 mg per tablet   Yes No   Sig: Take 1 tablet by mouth daily as needed   warfarin (COUMADIN) 3 mg tablet   Yes No   Sig: Take 3 mg by mouth daily Facility-Administered Medications: None       Past Medical History:   Diagnosis Date    Atrial fibrillation ([facility] )     COPD (chronic obstructive pulmonary disease) ([facility] )     Diabetes mellitus ([facility] )     Hypertension     Respiratory failure ([facility] )        Past Surgical History:   Procedure Laterality Date    COLONOSCOPY      MITRAL VALVE REPLACEMENT      Bovine       History reviewed  No pertinent family history  I have reviewed and agree with the history as documented  E-Cigarette/Vaping    E-Cigarette Use Former User      E-Cigarette/Vaping Substances    Nicotine No     THC No     CBD No     Flavoring No     Other No     Unknown No      Social History     Tobacco Use    Smoking status: Former Smoker     Quit date: 2010     Years since quittin 4    Smokeless tobacco: Never Used   Vaping Use    Vaping Use: Former   Substance Use Topics    Alcohol use: Not Currently    Drug use: Not Currently       Review of Systems   Constitutional: Negative  HENT: Negative  Eyes: Negative  Respiratory: Positive for shortness of breath  Negative for apnea, cough, choking, chest tightness, wheezing and stridor  Cardiovascular: Negative  Gastrointestinal: Negative  Endocrine: Negative  Genitourinary: Negative  Musculoskeletal: Negative  Skin: Negative  Allergic/Immunologic: Negative  Neurological: Negative  Hematological: Negative  Psychiatric/Behavioral: Negative  Physical Exam  Physical Exam  Vitals and nursing note reviewed  Constitutional:       General: She is not in acute distress  Appearance: She is well-developed  She is not ill-appearing, toxic-appearing or diaphoretic  Interventions: She is not intubated  Comments: avss-- pulse ox 96 % on 2 liters o2 nc- interpretation is normal- in nad    HENT:      Head: Normocephalic and atraumatic  Eyes:      Extraocular Movements: Extraocular movements intact        Pupils: Pupils are equal, round, and reactive to light  Comments: Mm pink   Neck:      Thyroid: No thyromegaly  Vascular: No hepatojugular reflux or JVD  Trachea: No tracheal deviation  Comments: No pmt c/t/l/s spine   Cardiovascular:      Rate and Rhythm: Normal rate and regular rhythm  No extrasystoles are present  Pulses: Normal pulses  Heart sounds: Normal heart sounds  No murmur heard  No friction rub  No gallop  Pulmonary:      Effort: Tachypnea present  No bradypnea, accessory muscle usage or respiratory distress  She is not intubated  Breath sounds: No stridor  Examination of the right-lower field reveals rales  Examination of the left-lower field reveals rales  Rales present  No decreased breath sounds, wheezing or rhonchi  Chest:      Chest wall: No mass, deformity, tenderness, crepitus or edema  There is no dullness to percussion  Abdominal:      General: Bowel sounds are normal       Palpations: Abdomen is soft  There is no hepatomegaly, splenomegaly or mass  Tenderness: There is no abdominal tenderness  There is no guarding or rebound  Comments: Soft ntnd- no hsm- no cva tenderness- no peritoneal signs   Musculoskeletal:         General: Normal range of motion  Cervical back: Normal range of motion and neck supple  Right lower leg: No tenderness  Edema present  Left lower leg: No tenderness  Edema present  Comments: Equal bilateral radial/dp pulses- trace ble pretibial edema- nt no asym/ erythema   Lymphadenopathy:      Cervical: No cervical adenopathy  Skin:     General: Skin is warm  Capillary Refill: Capillary refill takes less than 2 seconds  Coloration: Skin is not cyanotic or pale  Findings: No ecchymosis, erythema or rash  Nails: There is no clubbing  Neurological:      General: No focal deficit present  Mental Status: She is alert and oriented to person, place, and time  Cranial Nerves: No cranial nerve deficit  Motor: No weakness  Comments: Normal non focal neuro exam    Psychiatric:         Mood and Affect: Mood is anxious           Behavior: Behavior normal          Vital Signs  ED Triage Vitals   Temperature Pulse Respirations Blood Pressure SpO2   06/17/21 2202 06/17/21 1803 06/17/21 1803 06/17/21 1803 06/17/21 1803   97 8 °F (36 6 °C) 73 18 123/68 98 %      Temp Source Heart Rate Source Patient Position - Orthostatic VS BP Location FiO2 (%)   06/17/21 2202 06/17/21 1803 06/17/21 1803 06/17/21 1803 --   Oral Monitor Lying Right arm       Pain Score       06/17/21 2240       No Pain           Vitals:    06/22/21 0513 06/22/21 0700 06/22/21 1626 06/22/21 1900   BP: 133/60 127/64 108/59 100/55   Pulse: 65 68 94 95   Patient Position - Orthostatic VS: Lying Lying Sitting Lying         Visual Acuity      ED Medications  Medications   ferrous sulfate tablet 325 mg (325 mg Oral Given 6/22/21 1008)   digoxin (LANOXIN) tablet 125 mcg (125 mcg Oral Given 6/22/21 1007)   albuterol (PROVENTIL HFA,VENTOLIN HFA) inhaler 2 puff (has no administration in time range)   senna-docusate sodium (SENOKOT S) 8 6-50 mg per tablet 1 tablet (1 tablet Oral Refused 6/17/21 2334)   pantoprazole (PROTONIX) EC tablet 40 mg (40 mg Oral Given 6/22/21 0512)   potassium chloride (K-DUR,KLOR-CON) CR tablet 20 mEq (20 mEq Oral Given 6/22/21 1932)   montelukast (SINGULAIR) tablet 10 mg (10 mg Oral Given 6/22/21 1007)   insulin lispro (HumaLOG) 100 units/mL subcutaneous injection 1-5 Units (1 Units Subcutaneous Not Given 6/22/21 1700)   insulin lispro (HumaLOG) 100 units/mL subcutaneous injection 1-5 Units (1 Units Subcutaneous Given 6/21/21 2102)   Diclofenac Sodium (VOLTAREN) 1 % topical gel 2 g (2 g Topical Given 6/22/21 1932)   fluticasone-vilanterol (BREO ELLIPTA) 100-25 mcg/inh inhaler 1 puff (1 puff Inhalation Given 6/22/21 1008)   ipratropium-albuterol (DUO-NEB) 0 5-2 5 mg/3 mL inhalation solution 3 mL (has no administration in time range) lidocaine (LIDODERM) 5 % patch 1 patch (1 patch Topical Medication Applied 6/22/21 1007)   sodium chloride (OCEAN) 0 65 % nasal spray 1 spray (1 spray Each Nare Given 6/19/21 2149)   LORazepam (ATIVAN) tablet 0 25 mg (has no administration in time range)   acetaminophen (TYLENOL) tablet 975 mg (975 mg Oral Given 6/22/21 1420)   bumetanide (BUMEX) injection 1 mg (1 mg Intravenous Given 6/22/21 1932)   oxyCODONE (ROXICODONE) IR tablet 2 5 mg (has no administration in time range)   LORazepam (ATIVAN) tablet 0 5 mg (0 5 mg Oral Given 6/17/21 2334)   iohexol (OMNIPAQUE) 350 MG/ML injection (SINGLE-DOSE) 85 mL (85 mL Intravenous Given 6/18/21 0505)   budesonide-formoterol (SYMBICORT) 80-4 5 MCG/ACT inhaler 2 puff (2 puffs Inhalation Given 6/18/21 1757)       Diagnostic Studies  Results Reviewed     Procedure Component Value Units Date/Time    NT-BNP PRO [[id number]]  (Abnormal) Collected: 06/17/21 2327    Lab Status: Final result Specimen: Blood from Arm, Left Updated: 06/17/21 2359     NT-proBNP 4,339 pg/mL     NT-BNP PRO [[id number]]  (Abnormal) Collected: 06/17/21 1842    Lab Status: Final result Specimen: Blood from Arm, Left Updated: 06/17/21 2316     NT-proBNP 4,538 pg/mL     Procalcitonin with AM Reflex [[id number]]     Lab Status: No result Specimen: Blood     Troponin I [[id number]]  (Normal) Collected: 06/17/21 1842    Lab Status: Final result Specimen: Blood from Arm, Left Updated: 06/17/21 1925     Troponin I <0 02 ng/mL     Digoxin level [[id number]]  (Normal) Collected: 06/17/21 1842    Lab Status: Final result Specimen: Blood from Arm, Left Updated: 06/17/21 1924     Digoxin Lvl 1 4 ng/mL     Comprehensive metabolic panel [[id number]]  (Abnormal) Collected: 06/17/21 1842    Lab Status: Final result Specimen: Blood from Arm, Left Updated: 06/17/21 1923     Sodium 142 mmol/L      Potassium 3 8 mmol/L      Chloride 101 mmol/L      CO2 32 mmol/L      ANION GAP 9 mmol/L      BUN 22 mg/dL      Creatinine 1 03 mg/dL Glucose 94 mg/dL      Calcium 9 0 mg/dL      Corrected Calcium 10 0 mg/dL      AST 21 U/L      ALT 17 U/L      Alkaline Phosphatase 73 U/L      Total Protein 7 2 g/dL      Albumin 2 7 g/dL      Total Bilirubin 0 48 mg/dL      eGFR 54 ml/min/1 73sq m     Narrative:      [place] guidelines for Chronic Kidney Disease (CKD):     Stage 1 with normal or high GFR (GFR > 90 mL/min/1 73 square meters)    Stage 2 Mild CKD (GFR = 60-89 mL/min/1 73 square meters)    Stage 3A Moderate CKD (GFR = 45-59 mL/min/1 73 square meters)    Stage 3B Moderate CKD (GFR = 30-44 mL/min/1 73 square meters)    Stage 4 Severe CKD (GFR = 15-29 mL/min/1 73 square meters)    Stage 5 End Stage CKD (GFR <15 mL/min/1 73 square meters)  Note: GFR calculation is accurate only with a steady state creatinine    Protime-INR [[id number]]  (Abnormal) Collected: 06/17/21 1842    Lab Status: Final result Specimen: Blood from Arm, Left Updated: 06/17/21 1916     Protime 27 9 seconds      INR 2 60    CBC and differential [[id number]]  (Abnormal) Collected: 06/17/21 1842    Lab Status: Final result Specimen: Blood from Arm, Left Updated: 06/17/21 1853     WBC 7 20 Thousand/uL      RBC 3 39 Million/uL      Hemoglobin 8 8 g/dL      Hematocrit 29 7 %      MCV 88 fL      MCH 26 0 pg      MCHC 29 6 g/dL      RDW 17 6 %      MPV 9 8 fL      Platelets 373 Thousands/uL      nRBC 0 /100 WBCs      Neutrophils Relative 69 %      Immat GRANS % 1 %      Lymphocytes Relative 18 %      Monocytes Relative 8 %      Eosinophils Relative 3 %      Basophils Relative 1 %      Neutrophils Absolute 5 05 Thousands/µL      Immature Grans Absolute 0 05 Thousand/uL      Lymphocytes Absolute 1 28 Thousands/µL      Monocytes Absolute 0 58 Thousand/µL      Eosinophils Absolute 0 18 Thousand/µL      Basophils Absolute 0 06 Thousands/µL     Blood gas, venous [[id number]]  (Abnormal) Collected: 06/17/21 1842    Lab Status: Final result Specimen: Blood from Arm, Left Updated: 06/17/21 1852     pH, [place] 7 444     pCO2, [place] 47 5 mm Hg      pO2, [place] 32 3 mm Hg      HCO3, [place] 31 8 mmol/L      Base Excess, [place] 6 9 mmol/L      O2 Content, [place] 7 4 ml/dL      O2 HGB, VENOUS 54 5 %                  CT chest w contrast   Final Result by Manjit Rahman DO (06/18 5644)   1  No significant change in 3 cm noncalcified nodule posterolateral aspect right upper lobe  Based on current Fleischner Society 2017 Guidelines on incidental pulmonary nodule, either PET/CT scan evaluation, tissue sampling or short term interval    followup non-contrast CT followup (initially in 3 months) may be considered appropriate  2   Diffuse interstitial prominence in the upper lobes bilaterally, right side greater than left  Although the findings may be cardiogenic in nature, lymphangitic spread of neoplasm not excluded, given the presence of right upper lobe pulmonary nodule  3   Additional noncalcified pleural-based bilateral upper lobe soft tissue masses as described above  4   Scattered groundglass infiltrates throughout the lungs bilaterally, likely cardiogenic in nature  These do not have the typical appearance for Covid type pneumonia  5   Cardiomegaly with severe left atrial enlargement  Recommend follow-up cardiology consultation  The study was marked in [facility] for immediate notification  Workstation performed: [id number]         CT chest without contrast   Final Result by Lm Monroy MD (06/17 2033)      1  Masslike opacity in the right middle lobe measuring 2 6 x 4 0 cm   9 mm irregular nodular opacity in the right lower lobe abutting the major fissure  Mediastinal /subcarinal lymphadenopathy  Recommend contrast-enhanced chest CT for further    evaluation, and consider PET/CT and/or tissue sampling  2   Moderate right pleural effusion    Adjacent consolidative opacity in the right lower lobe at the lung base, may be due to atelectasis and/or pneumonia  3   Bilateral upper lobe septal thickening may be related to pulmonary edema  Multifocal bilateral groundglass opacities with considerations including pulmonary edema, infectious, or inflammatory etiology  Peripheral groundglass opacities noted    particularly in the right lower lobe, cannot exclude COVID-19 pneumonia  4   Cardiomegaly with marked left atrial dilatation  Consider correlation with echocardiogram          I personally discussed this study with Manish Choi on 6/17/2021 at 8:28 PM                Workstation performed: [id number]                    Procedures  Procedures         ED Course  ED Course as of Jun 22 2132   u Jun 17, 2021 1821 Er md note- recent labs reviewed by  er md- 6/1/7 /21  oupt  cxr report reviewed by er md       2053 Er md note- oral temp 98 0                                               MDM    Disposition  Final diagnoses:   Dyspnea, unspecified type   Mass of middle lobe of right lung   Pleural effusion on right     Time reflects when diagnosis was documented in both MDM as applicable and the Disposition within this note     Time User Action Codes Description Comment    6/17/2021  9:08 PM Abilio Furl D Add [R06 00] Dyspnea, unspecified type     6/17/2021  9:08 PM Anabel Sang Add [R91 8] Mass of middle lobe of right lung     6/17/2021  9:08 PM Abilio Furl D Add [J90] Pleural effusion on right     6/17/2021  9:23 PM Manolo Navarrete Add [R06 02] Shortness of breath       ED Disposition     ED Disposition Condition Date/Time Comment    Admit Stable Thu Jun 17, 2021  9:10 PM Case was discussed with David ZIEGLER and the patient's admission status was agreed to be Admission Status: observation status to the service of Dr Jannette Gan             Follow-up Information    None         Current Discharge Medication List      CONTINUE these medications which have NOT CHANGED    Details   albuterol (PROVENTIL HFA,VENTOLIN HFA) 90 mcg/act inhaler Inhale 2 puffs every 6 (six) hours as needed    Comments: Substitution to a formulary equivalent within the same pharmaceutical class is authorized  budesonide-formoterol (SYMBICORT) 80-4 5 MCG/ACT inhaler Inhale 2 puffs 2 (two) times a day Rinse mouth after use  digoxin (LANOXIN) 0 125 mg tablet Take 0 125 mg by mouth daily      ferrous sulfate (EQL Iron Supplement Therapy) 325 (65 Fe) mg tablet Take 1 tablet (325 mg total) by mouth daily with breakfast  Qty: 30 tablet, Refills: 0    Associated Diagnoses: GI bleed      furosemide (LASIX) 40 mg tablet Take 40 mg by mouth 2 (two) times a day      melatonin 3 mg Take 1 tablet (3 mg total) by mouth daily at bedtime  Qty: 30 tablet, Refills: 0    Associated Diagnoses: GI bleed      montelukast (SINGULAIR) 10 mg tablet Take 1 tablet by mouth daily      nystatin (MYCOSTATIN) powder Apply topically 2 (two) times a day Apply to skin folds  Qty: 15 g, Refills: 0    Associated Diagnoses: GI bleed      omeprazole (PriLOSEC) 20 mg delayed release capsule Take 20 mg by mouth daily      potassium chloride (K-DUR,KLOR-CON) 10 mEq tablet Take 20 mEq by mouth 2 (two) times a day      senna-docusate sodium (Senokot S) 8 6-50 mg per tablet Take 1 tablet by mouth daily as needed      warfarin (COUMADIN) 3 mg tablet Take 3 mg by mouth daily           No discharge procedures on file      PDMP Review     None          ED Provider  Electronically Signed by           Christofer Juarez MD  06/22/21 6341 decreased ability to use arms for pushing/pulling

## 2022-03-31 NOTE — PROGRESS NOTE ADULT - SUBJECTIVE AND OBJECTIVE BOX
HPI:  87y Female PMH HTN HLD anxiety resides in [place] and RHD presents c/o Right shoulder pain sp mechanical fall 3 days ago on 3-. Pt was on vacation w family in [place] and pt mis-stepped the last step at the bottom of the staircase and fell forward onto her right shoulder. She had immediate pain to the shoulder. Fall was witnessed by her step-daughter. She was seen at local hospital there and treated in a sling. The step-daughter is familiar w Dr Islas and decided to bring her step-mother her to the ED for evaluation. And xray was done in the ED today and Orthopedics was called to manage. Pt was seen, had a CT scan all reviewed by DR Islas who recommends reverse shoulder arthroplasty to regain function asap. Pt wishes to have surgery here and once recovered return to [place] where she lives alone and cares for herself. She does not drive due to macular degeneration and uses no assistive devices. She is otherwise very active. Denies HS/LOC. Denies numbness/tingling. Denies fever/chills. Denies pain/injury elsewhere. No other complaints. Pt seen and treated by the Orthopedic PA/Resident Team.       Patient is a 87y old  Female who presents with a chief complaint of RIght humerus fracture (30 Mar 2022 05:46)      Consulted by Dr. Mat Islas  for VTE prophylaxis, risk stratification, and anticoagulation management.    PAST MEDICAL & SURGICAL HISTORY:  Macular degeneration    Anxiety    HTN (hypertension)        FAMILY HISTORY:      Interval Note:  3- Pt seen at bedside on [facility] with daughter present.  Discussed with pt she will need prophylactic anticoagulation post procedure either Lovenox or enteric coated aspirin.  Pt states she prefers aspirin but will re evaluate pt post procedure. Benefits and risks discussed.   3- Pt seen at bedside on [facility].  S/p rt tsr yesterday 3-.  Discussed with pt the use of aspirin 325mg enteric coated one tab twice a day for 2 weeks. Pt understands the risks. States she will go home with her daughter.       CAPRINI SCORE  AGE RELATED RISK FACTORS                                                       MOBILITY RELATED FACTORS  [ ] Age 41-60 years                                            (1 Point)                  [ ] Bed rest                                                        (1 Point)  [ ] Age: 61-74 years                                           (2 Points)                [ ] Plaster cast                                                   (2 Points)  [x ] Age= 75 years                                              (3 Points)                 [ ] Bed bound for more than 72 hours                   (2 Points)    DISEASE RELATED RISK FACTORS                                               GENDER SPECIFIC FACTORS  [ ] Edema in the lower extremities                       (1 Point)           [ ] Pregnancy                                                            (1 Point)  [ ] Varicose veins                                               (1 Point)                  [ ] Post-partum < 6 weeks                                      (1 Point)             [ ] BMI > 25 Kg/m2                                            (1 Point)                  [ ] Hormonal therapy or oral contraception       (1 Point)                 [ ] Sepsis (in the previous month)                        (1 Point)             [ ] History of pregnancy complications                (1Point)  [ ] Pneumonia or serious lung disease                                             [ ] Unexplained or recurrent  (=/>3), premature                                 (In the previous month)                               (1 Point)                birth with toxemia or growth-restricted infant (1 Point)  [ ] Abnormal pulmonary function test            (1 Point)                                   SURGERY RELATED RISK FACTORS  [ ] Acute myocardial infarction                       (1 Point)                  [ ]  Section                                         (1 Point)  [ ] Congestive heart failure (in the previous month) (1 Point)   [ ] Minor surgery   lasting <45 minutes       (1 Point)   [ ] Inflammatory bowel disease                             (1 Point)          [ ] Arthroscopic surgery                                  (2 Points)  [ ] Central venous access                                    (2 Points)            [ x] General surgery lasting >45 minutes      (2 Points)       [ ] Stroke (in the previous month)                  (5 Points)            [ ] Elective major lower extremity arthroplasty (5 Points)                                   [  ] Malignancy (present or past include skin melanoma                                          but exclude  basal skin cell)    (2 points)                                      TRAUMA RELATED RISK FACTORS                HEMATOLOGY RELATED FACTORS                                  [ ] Fracture of the hip, pelvis, or leg                       (5 Points)  [ ] Prior episodes of VTE                                     (3 Points)          [ ] Acute spinal cord injury (in the previous month)  (5 Points)  [ ] Positive family history for VTE                         (3 Points)       [ ] Paralysis (less than 1 month)                          (5 Points)  [ ] Prothrombin 72853 A                                      (3 Points)         [ ] Multiple Trauma (within 1month)                 (5Points)                                                                                                                                                                [ ] Factor V Leiden                                          (3 Points)                                OTHER RISK FACTORS                          [ ] Lupus anticoagulants                                     (3 Points)                       [ ] BMI > 40                          (1 Point)                                                         [ ] Anticardiolipin antibodies                                (3 Points)                   [ ] Smoking                              (1Point)                                                [ ] High homocysteine in the blood                      (3 Points)                [  ] Diabetes requiring insulin (1point)                         [ ] Other congenital or acquired thrombophilia       (3 Points)          [  ] Chemotherapy                   (1 Point)  [ ] Heparin induced thrombocytopenia                  (3 Points)             [  ] Blood Transfusion                (1 point)                                                                                                             Total Score [5]                                                                                                                                                                                                                                                                                                                                                                                                                                           IMPROVE Bleeding Risk Score: 3.5    Falls Risk:   High (  )  Mod (x  )  Low (  )  crcl: 71.9        cr: .39         BMI 17.6            EBL: pending  ml  Time procedure    : starts: pending             :ends: pending      Tourniquet time:        Denies any personal or familial history of clotting or bleeding disorders.    Allergies    Nickel (Rash)  No Known Drug Allergies    Intolerances        REVIEW OF SYSTEMS    (  )Fever	     (  )Constipation	(  )SOB				(  )Headache	(  )Dysuria  (  )Chills	     (  )Melena	(  )Dyspnea present on exertion	                    (  )Dizziness                    (  )Polyuria  (  )Nausea	     (  )Hematochezia	(  )Cough			                    (  )Syncope   	(  )Hematuria  (  )Vomiting    (  )Chest Pain	(  )Wheezing			(  )Weakness  (  )Diarrhea     (  )Palpitations	(  )Anorexia			(x  )Myalgia  (x) arm pain    Pertinent positives in HPI and daily subjective.  All other ROS negative.      Vital Signs Last 24 Hrs  T(C): 37.1 (30 Mar 2022 10:15), Max: 37.2 (30 Mar 2022 01:11)  T(F): 98.8 (30 Mar 2022 10:15), Max: 99 (30 Mar 2022 01:11)  HR: 84 (30 Mar 2022 10:15) (73 - 84)  BP: 150/61 (30 Mar 2022 10:15) (132/67 - 160/53)  BP(mean): 96 (29 Mar 2022 16:48) (96 - 96)  RR: 18 (30 Mar 2022 10:15) (17 - 18)  SpO2: 99% (30 Mar 2022 10:15) (92% - 100%)    PHYSICAL EXAM:    Constitutional: Appears Well    Neurological: A& O x 3    Skin: Warm    Respiratory and Thorax: normal effort; Breath sounds: normal; No rales/wheezing/rhonchi  	  Cardiovascular: S1, S2, regular, NMBR	    Gastrointestinal: BS + x 4Q, nontender	    Genitourinary:  Bladder nondistended, nontender    Musculoskeletal:   General Right:   no muscle/joint tenderness,   normal tone, no joint swelling,   ROM: limited	    General Left:   no muscle/joint tenderness,   normal tone, no joint swelling,   ROM: full        Shoulder:  Rt: swollen ecchymotic,  Sling/immob. noted; Cap refill good; Radial pulse +; Sensation intact                   Lower extrems:   Right: no calf tenderness              negative cristy's sign               + pedal pulses    Left:   no calf tenderness              negative cristy's sign               + pedal pulses                          7.7    6.34  )-----------( 179      ( 30 Mar 2022 03:43 )             24.7       03-30    143  |  109<H>  |  16  ----------------------------<  88  3.6   |  29  |  0.38<L>    Ca    8.4<L>      30 Mar 2022 03:43    TPro  x   /  Alb  2.7<L>  /  TBili  x   /  DBili  x   /  AST  x   /  ALT  x   /  AlkPhos  x   30      PT/INR - ( 30 Mar 2022 03:43 )   PT: 12.5 sec;   INR: 1.08 ratio         PTT - ( 30 Mar 2022 03:43 )  PTT:26.2 sec				  < from: CT Shoulder No Cont, Right (22 @ 13:33) >  IMPRESSION:  1. Displaced comminuted humeral neck fracture is again seen with one bone   width anterior and superior displacement of the diaphysis that is   positioned below the coracoid process.  2. There is comminution of the inferior margin of the lesser tuberosity   and slightly displaced fracture component involves the greater tuberosity.    < end of copied text >    MEDICATIONS  (STANDING):  acetaminophen     Tablet .. 975 milliGRAM(s) Oral every 8 hours  atorvastatin Oral Tab/Cap - Peds 10 milliGRAM(s) Oral at bedtime  cloNIDine 0.1 milliGRAM(s) Oral two times a day  escitalopram 10 milliGRAM(s) Oral daily  lactated ringers. 1000 milliLiter(s) IV Continuous <Continuous>  lisinopril 20 milliGRAM(s) Oral two times a day  pantoprazole    Tablet 40 milliGRAM(s) Oral before breakfast  senna 2 Tablet(s) Oral at bedtime          DVT Prophylaxis:  LMWH                   (hold  )  Heparin SQ           (  )  Coumadin             (  )  Xarelto                  (  )  Eliquis                   (  )  Venodynes           (  )  Ambulation          (  )  UFH                       (  )  Contraindicated  (  )  EC Aspirin             (  )         HPI:  87y Female PMH HTN HLD anxiety resides in [place] and RHD presents c/o Right shoulder pain sp mechanical fall 3 days ago on 3-. Pt was on vacation w family in [place] and pt mis-stepped the last step at the bottom of the staircase and fell forward onto her right shoulder. She had immediate pain to the shoulder. Fall was witnessed by her step-daughter. She was seen at local hospital there and treated in a sling. The step-daughter is familiar w Dr Islas and decided to bring her step-mother her to the ED for evaluation. And xray was done in the ED today and Orthopedics was called to manage. Pt was seen, had a CT scan all reviewed by DR Islas who recommends reverse shoulder arthroplasty to regain function asap. Pt wishes to have surgery here and once recovered return to [place] where she lives alone and cares for herself. She does not drive due to macular degeneration and uses no assistive devices. She is otherwise very active. Denies HS/LOC. Denies numbness/tingling. Denies fever/chills. Denies pain/injury elsewhere. No other complaints. Pt seen and treated by the Orthopedic PA/Resident Team.       Patient is a 87y old  Female who presents with a chief complaint of RIght humerus fracture (30 Mar 2022 05:46)      Consulted by Dr. Mat Islas  for VTE prophylaxis, risk stratification, and anticoagulation management.    PAST MEDICAL & SURGICAL HISTORY:  Macular degeneration    Anxiety    HTN (hypertension)        FAMILY HISTORY:      Interval Note:  3- Pt seen at bedside on [facility] with daughter present.  Discussed with pt she will need prophylactic anticoagulation post procedure either Lovenox or enteric coated aspirin.  Pt states she prefers aspirin but will re evaluate pt post procedure. Benefits and risks discussed.   3- Pt seen at bedside on [facility].  S/p rt tsr yesterday 3-.  Discussed with pt the use of aspirin 325mg enteric coated one tab twice a day for 2 weeks. Pt understands the risks. States she will go home with her daughter.       CAPRINI SCORE  AGE RELATED RISK FACTORS                                                       MOBILITY RELATED FACTORS  [ ] Age 41-60 years                                            (1 Point)                  [ ] Bed rest                                                        (1 Point)  [ ] Age: 61-74 years                                           (2 Points)                [ ] Plaster cast                                                   (2 Points)  [x ] Age= 75 years                                              (3 Points)                 [ ] Bed bound for more than 72 hours                   (2 Points)    DISEASE RELATED RISK FACTORS                                               GENDER SPECIFIC FACTORS  [ ] Edema in the lower extremities                       (1 Point)           [ ] Pregnancy                                                            (1 Point)  [ ] Varicose veins                                               (1 Point)                  [ ] Post-partum < 6 weeks                                      (1 Point)             [ ] BMI > 25 Kg/m2                                            (1 Point)                  [ ] Hormonal therapy or oral contraception       (1 Point)                 [ ] Sepsis (in the previous month)                        (1 Point)             [ ] History of pregnancy complications                (1Point)  [ ] Pneumonia or serious lung disease                                             [ ] Unexplained or recurrent  (=/>3), premature                                 (In the previous month)                               (1 Point)                birth with toxemia or growth-restricted infant (1 Point)  [ ] Abnormal pulmonary function test            (1 Point)                                   SURGERY RELATED RISK FACTORS  [ ] Acute myocardial infarction                       (1 Point)                  [ ]  Section                                         (1 Point)  [ ] Congestive heart failure (in the previous month) (1 Point)   [ ] Minor surgery   lasting <45 minutes       (1 Point)   [ ] Inflammatory bowel disease                             (1 Point)          [ ] Arthroscopic surgery                                  (2 Points)  [ ] Central venous access                                    (2 Points)            [ x] General surgery lasting >45 minutes      (2 Points)       [ ] Stroke (in the previous month)                  (5 Points)            [ ] Elective major lower extremity arthroplasty (5 Points)                                   [  ] Malignancy (present or past include skin melanoma                                          but exclude  basal skin cell)    (2 points)                                      TRAUMA RELATED RISK FACTORS                HEMATOLOGY RELATED FACTORS                                  [ ] Fracture of the hip, pelvis, or leg                       (5 Points)  [ ] Prior episodes of VTE                                     (3 Points)          [ ] Acute spinal cord injury (in the previous month)  (5 Points)  [ ] Positive family history for VTE                         (3 Points)       [ ] Paralysis (less than 1 month)                          (5 Points)  [ ] Prothrombin 38003 A                                      (3 Points)         [ ] Multiple Trauma (within 1month)                 (5Points)                                                                                                                                                                [ ] Factor V Leiden                                          (3 Points)                                OTHER RISK FACTORS                          [ ] Lupus anticoagulants                                     (3 Points)                       [ ] BMI > 40                          (1 Point)                                                         [ ] Anticardiolipin antibodies                                (3 Points)                   [ ] Smoking                              (1Point)                                                [ ] High homocysteine in the blood                      (3 Points)                [  ] Diabetes requiring insulin (1point)                         [ ] Other congenital or acquired thrombophilia       (3 Points)          [  ] Chemotherapy                   (1 Point)  [ ] Heparin induced thrombocytopenia                  (3 Points)             [  ] Blood Transfusion                (1 point)                                                                                                             Total Score [5]                                                                                                                                                                                                                                                                                                                                                                                                                                           IMPROVE Bleeding Risk Score: 3.5    Falls Risk:   High (  )  Mod (x  )  Low (  )  crcl: 71.9        cr: .39         BMI 17.6            EBL: pending  ml  Time procedure    : starts: 19:26             :ends: 20:55      Denies any personal or familial history of clotting or bleeding disorders.    Allergies    Nickel (Rash)  No Known Drug Allergies    Intolerances        REVIEW OF SYSTEMS    (  )Fever	     (  )Constipation	(  )SOB				(  )Headache	(  )Dysuria  (  )Chills	     (  )Melena	(  )Dyspnea present on exertion	                    (  )Dizziness                    (  )Polyuria  (  )Nausea	     (  )Hematochezia	(  )Cough			                    (  )Syncope   	(  )Hematuria  (  )Vomiting    (  )Chest Pain	(  )Wheezing			(  )Weakness  (  )Diarrhea     (  )Palpitations	(  )Anorexia			(x  )Myalgia  (x) arm pain    Pertinent positives in HPI and daily subjective.  All other ROS negative.      Vital Signs Last 24 Hrs  T(C): 37 (22 @ 08:25), Max: 37 (22 @ 08:25)  T(F): 98.6 (22 @ 08:25), Max: 98.6 (22 @ 08:25)  HR: 78 (- @ 08:25) (68 - 98)  BP: 166/55 (22 @ 08:25) (128/40 - 189/98)  BP(mean): --  RR: 17 (22 @ 08:25) (14 - 18)  SpO2: 100% (22 @ 08:25) (93% - 100%)  PHYSICAL EXAM:    Constitutional: Appears Well    Neurological: A& O x 3    Skin: Warm    Respiratory and Thorax: normal effort; Breath sounds: normal; No rales/wheezing/rhonchi  	  Cardiovascular: S1, S2, regular, NMBR	    Gastrointestinal: BS + x 4Q, nontender	    Genitourinary:  Bladder nondistended, nontender    Musculoskeletal:   General Right:   no muscle/joint tenderness,   normal tone, no joint swelling,   ROM: limited	    General Left:   no muscle/joint tenderness,   normal tone, no joint swelling,   ROM: full        Shoulder:  Rt: swollen ecchymotic,  Sling/immob. noted; Cap refill good; Radial pulse +; Sensation intact                   Lower extrems:   Right: no calf tenderness              negative cristy's sign               + pedal pulses    Left:   no calf tenderness              negative cristy's sign               + pedal pulses                        8.9    9.38  )-----------( 190      ( 31 Mar 2022 08:55 )             28.0       03-31    142  |  108  |  13  ----------------------------<  224<H>  4.2   |  28  |  0.64    Ca    8.3<L>      31 Mar 2022 08:55    TPro  x   /  Alb  2.7<L>  /  TBili  x   /  DBili  x   /  AST  x   /  ALT  x   /  AlkPhos  x   03-30      PT/INR - ( 30 Mar 2022 12:35 )   PT: 12.6 sec;   INR: 1.09 ratio         PTT - ( 30 Mar 2022 03:43 )  PTT:26.2 sec                        7.7    6.34  )-----------( 179      ( 30 Mar 2022 03:43 ) one unit of PRBC             24.7           143  |  109<H>  |  16  ----------------------------<  88  3.6   |  29  |  0.38<L>    Ca    8.4<L>      30 Mar 2022 03:43    TPro  x   /  Alb  2.7<L>  /  TBili  x   /  DBili  x   /  AST  x   /  ALT  x   /  AlkPhos  x   03-30      PT/INR - ( 30 Mar 2022 03:43 )   PT: 12.5 sec;   INR: 1.08 ratio         PTT - ( 30 Mar 2022 03:43 )  PTT:26.2 sec				  < from: CT Shoulder No Cont, Right (22 @ 13:33) >  IMPRESSION:  1. Displaced comminuted humeral neck fracture is again seen with one bone   width anterior and superior displacement of the diaphysis that is   positioned below the coracoid process.  2. There is comminution of the inferior margin of the lesser tuberosity   and slightly displaced fracture component involves the greater tuberosity.    < end of copied text >    MEDICATIONS  (STANDING):  acetaminophen     Tablet .. 975 milliGRAM(s) Oral every 8 hours  atorvastatin Oral Tab/Cap - Peds 10 milliGRAM(s) Oral at bedtime  ceFAZolin   IVPB 2000 milliGRAM(s) IV Intermittent every 8 hours  celecoxib 200 milliGRAM(s) Oral every 12 hours  cholecalciferol 800 Unit(s) Oral daily  cloNIDine 0.1 milliGRAM(s) Oral two times a day  escitalopram 10 milliGRAM(s) Oral daily  lactated ringers. 1000 milliLiter(s) IV Continuous <Continuous>  lactated ringers. 1000 milliLiter(s) IV Continuous <Continuous>  lisinopril 20 milliGRAM(s) Oral two times a day  pantoprazole    Tablet 40 milliGRAM(s) Oral before breakfast  pantoprazole    Tablet 40 milliGRAM(s) Oral before breakfast  polyethylene glycol 3350 17 Gram(s) Oral at bedtime  senna 2 Tablet(s) Oral at bedtime  senna 2 Tablet(s) Oral at bedtime  ASPIRIN 325MG ENTERIC ONE TAB TWICE A DAY           DVT Prophylaxis:  LMWH                   ( )  Heparin SQ           (  )  Coumadin             (  )  Xarelto                  (  )  Eliquis                   (  )  Venodynes           (  X)  Ambulation          ( X )  UFH                       (  )  Contraindicated  (  )  EC Aspirin             ( X )

## 2022-03-31 NOTE — PROGRESS NOTE ADULT - SUBJECTIVE AND OBJECTIVE BOX
POD 1 Right Reverse TSA  Pt Seen and Examined. Pain is controlled. Feeling well overall. No nausea or vomiting. Full sensation returned with the exception of the deltoid s/p block.     Vital Signs Last 24 Hrs  T(C): 36.7 (31 Mar 2022 05:40), Max: 37.1 (30 Mar 2022 06:24)  T(F): 98 (31 Mar 2022 05:40), Max: 98.8 (30 Mar 2022 06:24)  HR: 68 (31 Mar 2022 05:40) (68 - 98)  BP: 169/56 (31 Mar 2022 05:40) (128/40 - 189/98)  RR: 18 (31 Mar 2022 05:40) (14 - 18)  SpO2: 100% (31 Mar 2022 05:40) (93% - 100%)                 LABS:                        9.9    9.10  )-----------( 184      ( 30 Mar 2022 21:51 )             30.2     03-30    143  |  110<H>  |  11  ----------------------------<  127<H>  3.7   |  28  |  0.49<L>    Ca    8.6      30 Mar 2022 21:51    TPro  x   /  Alb  2.7<L>  /  TBili  x   /  DBili  x   /  AST  x   /  ALT  x   /  AlkPhos  x   03-30    PT/INR - ( 30 Mar 2022 12:35 )   PT: 12.6 sec;   INR: 1.09 ratio         PTT - ( 30 Mar 2022 03:43 )  PTT:26.2 sec            Exam:    Gen: NAD  RUE:  Dressing clean D&I  In Shldr Immobilizer  Sensation intact throughout the RUE with the exception of the deltoid.   -Ax 2/2 block; +Musc/Rad/Uln/Med/AIN/PIN  +Radial pulse  Soft compartments, - calf tenderness      A/P:   87yFemale s/p R Reverse TSA POD1.     -FU AM Labs  -RUE SHOULDER IMMOBILIZER AT ALL TIMES  -NO R SHOULDER ROM   -RUE NWB  -AC consult  -NO DRESSING CHANGES PER DR. DUKES  -Pain control  -DVT/PE ppx per AC  - PT cs  -Med Mgmt  - D/C Planning

## 2022-03-31 NOTE — PROGRESS NOTE ADULT - SUBJECTIVE AND OBJECTIVE BOX
c/c: right humerus fracture, RUE pain    HPI: 87F, pmh of HTN, HLD, Anxiety, Mitral valve prolapse,LBBB, Macular degeneration, cataracts who underwent medical clearance in prep for cataract surgery, but cancelled d/t the pandemic and she has since opted out of the procedure who presented to the ED with a right proximal humerus fracture. [place] lives in [place] was coming downstairs, misjudged the last step as it was the same color as the sidewalk and fell onto her right UE on 3/25.   She went to a local ED, had xrays and was told to have a proximal humerus fracture. She was put in a sling and advised outpt f/u with her outpatient orthopedics who she could not get an appt with for 3 weeks. Her family flew her up here and she is now being admitted for surgery.   She denies any sob/chest pains. Walks every day. Has no trouble walking a few blocks or climbing a flight of stairs. No recent f/c/r. No cough/sputum/dysuria.   Not on blood thinners, does not bleed excessively. Has never had general anesthesia before.   Had some n/v afterlleaving ER in [place] which lasted about 1 day. No parasthesias. no focal weakness. Denies h/o cardiac disease or stroke. Last stress test was >10 years ago.   Last ate around 10 am 3/29.      3/31: pt seen at bedside, S/P right reverse TSR, awake, alert, right arm in sling, presently comfortable, + voiding, no cp or sob      ROS: all 10 systems reviewed and is as above otherwise negative.     Vital Signs Last 24 Hrs  T(C): 37 (03-31-22 @ 08:25), Max: 37 (03-31-22 @ 08:25)  T(F): 98.6 (03-31-22 @ 08:25), Max: 98.6 (03-31-22 @ 08:25)  HR: 78 (03-31-22 @ 08:25) (68 - 98)  BP: 166/55 (03-31-22 @ 08:25) (128/40 - 189/98)  BP(mean): --  RR: 17 (03-31-22 @ 08:25) (14 - 18)  SpO2: 100% (03-31-22 @ 08:25) (93% - 100%)    PHYSICAL EXAM:    GENERAL: Comfortable, no acute distress   HEAD:  Normocephalic, atraumatic  EYES: EOMI, PERRLA  HEENT: Moist mucous membranes  NECK: Supple, No JVD  NERVOUS SYSTEM:  Alert & Oriented X3, non focal.   CHEST/LUNG: Clear to auscultation bilaterally  HEART: Regular rate and rhythm  ABDOMEN: Soft, Nontender, Nondistended, Bowel sounds present  GENITOURINARY: Voiding, no palpable bladder  EXTREMITIES:   No clubbing, cyanosis, or edema  MUSCULOSKELETAL- RUE sling. +ecchymosis/swelling with underlying hematoma.   SKIN-no rash    LABS:                                            8.9    9.38  )-----------( 190      ( 31 Mar 2022 08:55 )             28.0       03-31    142  |  108  |  13  ----------------------------<  224<H>  4.2   |  28  |  0.64    Ca    8.3<L>      31 Mar 2022 08:55    TPro  x   /  Alb  2.7<L>  /  TBili  x   /  DBili  x   /  AST  x   /  ALT  x   /  AlkPhos  x   03-30      PT/INR - ( 30 Mar 2022 12:35 )   PT: 12.6 sec;   INR: 1.09 ratio         PTT - ( 30 Mar 2022 03:43 )  PTT:26.2 sec      CT 3D Reconstruct w/o Workstation (03.29.22 @ 13:33) >  IMPRESSION:  1. Displaced comminuted humeral neck fracture is again seen with one bone   width anterior and superior displacement of the diaphysis that is   positioned below the coracoid process.  2. There is comminution of the inferior margin of the lesser tuberosity   and slightly displaced fracture component involves the greater tuberosity.  3. Several tiny pulmonary nodules are noted measuring up to approximately   0.2 cm. Correlation with priors if available is suggested.  This may not   warrant additional followup if the patient has no risk factors per the   Fleischner society guidelines.  If the patient has risk factors a   followup chest CT is suggested in 12 months.    EKG: my read-LBBB    medic    ASSESSMENT AND PLAN:  87f, PMH as above a/w:    1. Subacute displaced Right proximal humerus fracture:  -admitted to ortho.   -pain control with tylenol, tramadol.   -labs/ekg reviewed. LBBB is old per d/w patient.   -incentive spirometry  -PT eval     2. Anemia:  -could be blood loss related to fracture (has hematoma).   -reviewed iron studies/b12/folate/retic:  -s/p transfusion    3. Pulmonary nodules:  -seen on CT shoulder.   -check CT dedicated to chest for further evaluation.     4. HTN:  -continue ace-i/clonidine with hold parameters     5. Anxiety:  -continue lexapro.     6. DVT px:  hold chemical prophylaxis until post op  AC consult post op    dispo: dispo home today           c/c: right humerus fracture, RUE pain    HPI: 87F, pmh of HTN, HLD, Anxiety, Mitral valve prolapse,LBBB, Macular degeneration, cataracts who underwent medical clearance in prep for cataract surgery, but cancelled d/t the pandemic and she has since opted out of the procedure who presented to the ED with a right proximal humerus fracture. She lives in [place] she was coming downstairs, misjudged the last step as it was the same color as the sidewalk and fell onto her right UE on 3/25.   She went to a local ED, had xrays and was told to have a proximal humerus fracture. She was put in a sling and advised outpt f/u with her outpatient orthopedics who she could not get an appt with for 3 weeks. Her family flew her up here and she is now being admitted for surgery.   She denies any sob/chest pains. Walks every day. Has no trouble walking a few blocks or climbing a flight of stairs. No recent f/c/r. No cough/sputum/dysuria.   Not on blood thinners, does not bleed excessively. Has never had general anesthesia before.   Had some n/v afterlleaving ER in [place] which lasted about 1 day. No parasthesias. no focal weakness. Denies h/o cardiac disease or stroke. Last stress test was >10 years ago.   Last ate around 10 am 3/29.      3/31: pt seen at bedside, S/P right reverse TSR, awake, alert, right arm in sling, presently comfortable, + voiding, no cp or sob      ROS: all 10 systems reviewed and is as above otherwise negative.     Vital Signs Last 24 Hrs  T(C): 37 (03-31-22 @ 08:25), Max: 37 (03-31-22 @ 08:25)  T(F): 98.6 (03-31-22 @ 08:25), Max: 98.6 (03-31-22 @ 08:25)  HR: 78 (03-31-22 @ 08:25) (68 - 98)  BP: 166/55 (03-31-22 @ 08:25) (128/40 - 189/98)  BP(mean): --  RR: 17 (03-31-22 @ 08:25) (14 - 18)  SpO2: 100% (03-31-22 @ 08:25) (93% - 100%)    PHYSICAL EXAM:    GENERAL: Comfortable, no acute distress   HEAD:  Normocephalic, atraumatic  EYES: EOMI, PERRLA  HEENT: Moist mucous membranes  NECK: Supple, No JVD  NERVOUS SYSTEM:  Alert & Oriented X3, non focal.   CHEST/LUNG: Clear to auscultation bilaterally  HEART: Regular rate and rhythm  ABDOMEN: Soft, Nontender, Nondistended, Bowel sounds present  GENITOURINARY: Voiding, no palpable bladder  EXTREMITIES:   No clubbing, cyanosis, or edema  MUSCULOSKELETAL- RUE sling. +ecchymosis/swelling with underlying hematoma.   SKIN-no rash    LABS:                                            8.9    9.38  )-----------( 190      ( 31 Mar 2022 08:55 )             28.0       03-31    142  |  108  |  13  ----------------------------<  224<H>  4.2   |  28  |  0.64    Ca    8.3<L>      31 Mar 2022 08:55    TPro  x   /  Alb  2.7<L>  /  TBili  x   /  DBili  x   /  AST  x   /  ALT  x   /  AlkPhos  x   03-30      PT/INR - ( 30 Mar 2022 12:35 )   PT: 12.6 sec;   INR: 1.09 ratio         PTT - ( 30 Mar 2022 03:43 )  PTT:26.2 sec      CT 3D Reconstruct w/o Workstation (03.29.22 @ 13:33) >  IMPRESSION:  1. Displaced comminuted humeral neck fracture is again seen with one bone   width anterior and superior displacement of the diaphysis that is   positioned below the coracoid process.  2. There is comminution of the inferior margin of the lesser tuberosity   and slightly displaced fracture component involves the greater tuberosity.  3. Several tiny pulmonary nodules are noted measuring up to approximately   0.2 cm. Correlation with priors if available is suggested.  This may not   warrant additional followup if the patient has no risk factors per the   Fleischner society guidelines.  If the patient has risk factors a   followup chest CT is suggested in 12 months.    EKG: my read-LBBB    medic    ASSESSMENT AND PLAN:  87f, PMH as above a/w:    1. Subacute displaced Right proximal humerus fracture:  -admitted to ortho.   -pain control with tylenol, tramadol.   -labs/ekg reviewed. LBBB is old per d/w patient.   -incentive spirometry  -PT eval     2. Anemia:  -could be blood loss related to fracture (has hematoma).   -reviewed iron studies/b12/folate/retic:  -s/p transfusion  -  HH  8.9 today    3. Pulmonary nodules:  -seen on CT shoulder.   - ct of chest  : Right upper lobe subpleural 4 mm nodule versus intrapulmonary lymph node.   Scattered linear atelectasis/scarring. Trace right pleural effusion.  _ f/u with Pulm as out pt      4. HTN:  -continue ace-i/clonidine with hold parameters     5. Anxiety:  -continue lexapro.     6. DVT px:  AC consult post op  ASA    dispo:  home today

## 2022-03-31 NOTE — DISCHARGE NOTE NURSING/CASE MANAGEMENT/SOCIAL WORK - NSDCPEFALRISK_GEN_ALL_CORE
For information on Fall & Injury Prevention, visit: [email or web address] OR  [email or web address] OR  https://www.cdc.gov/steadi/patient.html

## 2022-03-31 NOTE — PROGRESS NOTE ADULT - REASON FOR ADMISSION
RIght humerus fracture

## 2022-03-31 NOTE — PHYSICAL THERAPY INITIAL EVALUATION ADULT - PERTINENT HX OF CURRENT PROBLEM, REHAB EVAL
86yo F presents POD 1 following Right TSA. Pt experienced MF on 03/25/22 while on vacation in [place]. She went to a local ED, had xrays and diagnosed with proximal humerus fracture. She was put in a sling and advised outpt f/u with her outpatient orthopedics who she could not get an appt with for 3 weeks. Her family flew her up here and she was admitted for Surgery. 86yo F presents POD 1 following Right TSA. Pt experienced MF on 03/25/22 while on vacation in [place]. She went to a local ED, had xrays and diagnosed with proximal humerus fracture. She was put in a sling and advised outpt f/u with her outpatient orthopedics who she could not get an appt with for 3 weeks. Her family flew her up here and she was admitted for Surgery. Right TSA on 3/30/2022 without complication.

## 2022-03-31 NOTE — DISCHARGE NOTE NURSING/CASE MANAGEMENT/SOCIAL WORK - PATIENT PORTAL LINK FT
You can access the FollowMyHealth Patient Portal offered by [facility] by registering at the following website: [email or web address]. By joining [facility]’s FollowMyHealth portal, you will also be able to view your health information using other applications (apps) compatible with our system.

## 2022-03-31 NOTE — PROGRESS NOTE ADULT - SUBJECTIVE AND OBJECTIVE BOX
Orthopedics    POD 1 RIGHT Reverse Total Shoulder Arthroplasty for fracture  Pain controlled. Wiggling fingers well. Voiding spontaneously. No n/v. HAS been OOB and wants to go home today.    Vital Signs Last 24 Hrs  T(C): 37 (03-31-22 @ 08:25), Max: 37 (03-31-22 @ 08:25)  T(F): 98.6 (03-31-22 @ 08:25), Max: 98.6 (03-31-22 @ 08:25)  HR: 78 (03-31-22 @ 08:25) (68 - 98)  BP: 166/55 (03-31-22 @ 08:25) (128/40 - 189/98)  BP(mean): --  RR: 17 (03-31-22 @ 08:25) (14 - 18)  SpO2: 100% (03-31-22 @ 08:25) (93% - 100%)                        8.9    9.38  )-----------( 190      ( 31 Mar 2022 08:55 )             28.0     31 Mar 2022 08:55    142    |  108    |  13     ----------------------------<  224    4.2     |  28     |  0.64     Ca    8.3        31 Mar 2022 08:55    TPro  x      /  Alb  2.7    /  TBili  x      /  DBili  x      /  AST  x      /  ALT  x      /  AlkPhos  x      30 Mar 2022 03:43    PT/INR - ( 30 Mar 2022 12:35 )   PT: 12.6 sec;   INR: 1.09 ratio         PTT - ( 30 Mar 2022 03:43 )  PTT:26.2 sec  Exam:  NAD AAOx3  AROM: Intact AIN PIN, wrist ext/fex.  SILT all 5 digits  Dressing Clean and dry  Shoulder Immobilizer in place  2+ Radial Pulse

## 2022-03-31 NOTE — PROGRESS NOTE ADULT - ASSESSMENT
A/P:  POD 1 RIGHT Reverse Total Shoulder Arthroplasty for fracture    -Analgesia  -DVT PE ppx  -OOB ambulate  -No ROM to shoulder until follow up  -DC Planning Home today  -DR Islas on board

## 2022-04-04 DIAGNOSIS — H26.9 UNSPECIFIED CATARACT: ICD-10-CM

## 2022-04-04 DIAGNOSIS — D62 ACUTE POSTHEMORRHAGIC ANEMIA: ICD-10-CM

## 2022-04-04 DIAGNOSIS — I10 ESSENTIAL (PRIMARY) HYPERTENSION: ICD-10-CM
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
## 2022-04-04 DIAGNOSIS — S42.231A 3-PART FRACTURE OF SURGICAL NECK OF RIGHT HUMERUS, INITIAL ENCOUNTER FOR CLOSED FRACTURE: ICD-10-CM

## 2022-04-04 DIAGNOSIS — F41.9 ANXIETY DISORDER, UNSPECIFIED: ICD-10-CM

## 2022-04-04 DIAGNOSIS — I34.1 NONRHEUMATIC MITRAL (VALVE) PROLAPSE: ICD-10-CM
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
## 2022-04-04 DIAGNOSIS — E78.5 HYPERLIPIDEMIA, UNSPECIFIED: ICD-10-CM

## 2022-04-04 DIAGNOSIS — H35.30 UNSPECIFIED MACULAR DEGENERATION: ICD-10-CM
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
## 2022-04-04 DIAGNOSIS — R91.1 SOLITARY PULMONARY NODULE: ICD-10-CM

## 2022-04-04 DIAGNOSIS — I44.7 LEFT BUNDLE-BRANCH BLOCK, UNSPECIFIED: ICD-10-CM
